# Patient Record
Sex: MALE | Race: WHITE | NOT HISPANIC OR LATINO | Employment: OTHER | URBAN - METROPOLITAN AREA
[De-identification: names, ages, dates, MRNs, and addresses within clinical notes are randomized per-mention and may not be internally consistent; named-entity substitution may affect disease eponyms.]

---

## 2017-01-04 ENCOUNTER — ALLSCRIPTS OFFICE VISIT (OUTPATIENT)
Dept: OTHER | Facility: OTHER | Age: 62
End: 2017-01-04

## 2017-01-04 DIAGNOSIS — R63.5 ABNORMAL WEIGHT GAIN: ICD-10-CM

## 2017-05-03 ENCOUNTER — TRANSCRIBE ORDERS (OUTPATIENT)
Dept: LAB | Facility: CLINIC | Age: 62
End: 2017-05-03

## 2017-05-03 ENCOUNTER — APPOINTMENT (OUTPATIENT)
Dept: LAB | Facility: CLINIC | Age: 62
End: 2017-05-03
Payer: COMMERCIAL

## 2017-05-03 DIAGNOSIS — R63.5 ABNORMAL WEIGHT GAIN: ICD-10-CM

## 2017-05-03 LAB — TSH SERPL DL<=0.05 MIU/L-ACNC: 1.13 UIU/ML (ref 0.36–3.74)

## 2017-05-03 PROCEDURE — 36415 COLL VENOUS BLD VENIPUNCTURE: CPT

## 2017-05-03 PROCEDURE — 84443 ASSAY THYROID STIM HORMONE: CPT

## 2017-07-18 ENCOUNTER — GENERIC CONVERSION - ENCOUNTER (OUTPATIENT)
Dept: OTHER | Facility: OTHER | Age: 62
End: 2017-07-18

## 2018-01-15 NOTE — PROGRESS NOTES
Assessment    1  Never a smoker   2  Hyperglycemia (790 29) (R73 9)   3  Encounter for preventive health examination (V70 0) (Z00 00)   4  Esophageal reflux (530 81) (K21 9)   5  Atrial fibrillation (427 31) (I48 91)   6  Vitamin D deficiency (268 9) (E55 9)   7  Migraine syndrome (346 00) (G43 909)   8  Erectile dysfunction of non-organic origin (302 72) (F52 21)   9  Weight gain (783 1) (R63 5)    Plan  Atrial fibrillation, Migraine syndrome, Transient ischemic attack    · Propranolol HCl ER 60 MG Oral Capsule Extended Release 24 Hour; take 1  capsule daily  Erectile dysfunction of non-organic origin    · Viagra 100 MG Oral Tablet; Take as directed  Health Maintenance    · Zostavax 25155 UNT/0 65ML Subcutaneous Solution Reconstituted (Zostavax  42323 UNT/0 65ML Subcutaneous Solution Reconstituted); ONCE RECONSTITUTIED  INJECT 0 65MLSUB Q ONE TIME  ONLY  Weight gain    · (1) TSH WITH FT4 REFLEX; Status:Active; Requested YST:15TOQ0608;     Discussion/Summary    #1  Health maintenance-colonoscopy up-to-date  Flu shot given today  Rx for shingles vaccine given today  #2  Migraines-stable continue current prophylactic medication  #3  GERD-stable  #4  Atrial fib- stable continue cardiology  #5  History of hyperglycemia-fasting sugar under 10 and A1C is 5 5  Recheck in one year  #6  ED-stable  #7  History of vitamin D deficiency-vit D level is 32 which is going down  Pt will go back to 2000 IU daily  #8  weight gain- check TSH level and recommend My Fitness Pal       Chief Complaint  Pt  here for annual wellness visit  No concerns noted  kck      History of Present Illness  HPI: This is a 62year old male who presents to the office today for annual visit  He is overall doing well on all curerent medications but does report some concern over a recent weight gain of 5 lbs  in the past year  He is interested in checking a TSH   He recently went to an [facility] for a URI for which he finished his course of azithromycin and states that this is resolved now  He requests refills for his migraine medication which has been controlled on propanolol as well as for his ED which is controlled on siladenafil  He denies any issues with atrial fibrillation or his Pradaxa and denies any issues related to GERD  He received his flu shot today  HIs blood pressure, blood sugar and lipids are all within normal range with the exception of a slight rise in his triglycerides which the pt states he will try to watch carbohydrate intake  Labs showed LDL 78 triglyceride 152 vitamin D 32 which is been declining in the past 3-4 years glucose less 100 A1c 5 5 PSA normal       Review of Systems    Constitutional: recent weight gain, but as noted in HPI  Eyes: No complaints of eye pain, no red eyes, no discharge from eyes, no itchy eyes  ENT: no complaints of earache, no hearing loss, no nosebleeds, no nasal discharge, no sore throat, no hoarseness  Cardiovascular: No complaints of slow heart rate, no fast heart rate, no chest pain, no palpitations, no leg claudication, no lower extremity  Respiratory: No complaints of shortness of breath, no wheezing, no cough, no SOB on exertion, no orthopnea or PND  Gastrointestinal: No complaints of abdominal pain, no constipation, no nausea or vomiting, no diarrhea or bloody stools  Genitourinary: No complaints of dysuria, no incontinence, no hesitancy, no nocturia, no genital lesion, no testicular pain  Musculoskeletal: No complaints of arthralgia, no myalgias, no joint swelling or stiffness, no limb pain or swelling  Integumentary: No complaints of skin rash or skin lesions, no itching, no skin wound, no dry skin  Neurological: No compliants of headache, no confusion, no convulsions, no numbness or tingling, no dizziness or fainting, no limb weakness, no difficulty walking     Psychiatric: Is not suicidal, no sleep disturbances, no anxiety or depression, no change in personality, no emotional problems  Endocrine: No complaints of proptosis, no hot flashes, no muscle weakness, no erectile dysfunction, no deepening of the voice, no feelings of weakness  Hematologic/Lymphatic: No complaints of swollen glands, no swollen glands in the neck, does not bleed easily, no easy bruising  ROS reviewed  Active Problems    1  Allergic rhinitis (477 9) (J30 9)   2  Atrial fibrillation (427 31) (I48 91)   3  Cough (786 2) (R05)   4  Diverticulitis of colon (562 11) (K57 32)   5  Elbow Tendonitis (727 09)   6  Erectile dysfunction of non-organic origin (302 72) (F52 21)   7  Esophageal reflux (530 81) (K21 9)   8  Fatigue (780 79) (R53 83)   9  Hyperglycemia (790 29) (R73 9)   10  Infection, upper respiratory (465 9) (J06 9)   11  Migraine syndrome (346 00) (G43 909)   12  Sinusitis (473 9) (J32 9)   13  Transient ischemic attack (435 9) (G45 9)   14  Vitamin D deficiency (268 9) (E55 9)    Past Medical History    · Acute renal insufficiency (593 9) (N28 9)   · History of transient cerebral ischemia (V12 54) (Z86 73)   · Vitamin D deficiency (268 9) (E55 9)    Family History  Mother    · Family history of Arthritis (V17 7)  Father    · Family history of Arthritis (V17 7)    Social History    · Being A Social Drinker   · Never a smoker    Current Meds   1  Aspirin 81 MG TABS; Therapy: (Recorded:78Ngs7524) to Recorded   2  Coconut Oil CAPS; TAKE 1 CAPSULE Daily Recorded   3  Glucosamine 500 MG Oral Capsule; TAKE 1 CAPSULE 3 TIMES DAILY WITH MEALS; Therapy: [id number] to Recorded   4  Multivitamins CAPS; Therapy: (Recorded:60Wir7891) to Recorded   5  Pradaxa 150 MG Oral Capsule; TAKE 1 CAPSULE DAILY Recorded   6  Propranolol HCl ER 60 MG Oral Capsule Extended Release 24 Hour; take 1 capsule   daily; Therapy: 98GJN4496 to (Last Rx:34Ckd2333)  Requested for: 49Rvx8473; Status:   ACTIVE - Renewal Denied Ordered   7  Pumpkin Seed Oil Oral Capsule; Therapy: 42DPS8371 to Recorded   8   Red Yeast Rice 600 MG Oral Capsule; Take as directed Recorded   9  Viagra 100 MG Oral Tablet; Take as directed; Therapy: 79MVA3946 to (Last Rx:03Jan2013)  Requested for: 72HHH8757 Ordered   10  Vitamin D 1000 UNIT CAPS; TAKE 1 CAPSULE Daily; Therapy: 95NGQ8001 to (Last Rx:02Nov2015) Ordered    Allergies    1  No Known Drug Allergies    Vitals   Recorded: 49CDG0683 10:12AM   Heart Rate 64   Systolic 339, LUE, Sitting   Diastolic 70, LUE, Sitting   Height 6 ft 0 8 in   Weight 295 lb    BMI Calculated 39 14   BSA Calculated 2 53     Physical Exam    Constitutional   General appearance: No acute distress, well appearing and well nourished  Head and Face   Head and face: Normal     Eyes   Conjunctiva and lids: No erythema, swelling or discharge  Ears, Nose, Mouth, and Throat   External inspection of ears and nose: Normal     Pulmonary   Respiratory effort: No increased work of breathing or signs of respiratory distress  Auscultation of lungs: Clear to auscultation  Cardiovascular   Auscultation of heart: Abnormal   Irregularly irregular without murmur  Abdomen   Stool sample for occult blood: Negative  Guaiac negative  Genitourinary   Digital rectal exam of prostate: Normal size, no masses  Musculoskeletal   Gait and station: Normal     Neurologic   Coordination: Normal finger to nose and heel to shin  Psychiatric   Judgment and insight: Normal     Mood and affect: Normal        Results/Data  PHQ-2 Adult Depression Screening 20MDZ0633 10:15AM User, s     Test Name Result Flag Reference   PHQ-2 Adult Depression Score 0     Over the last two weeks, how often have you been bothered by any of the following problems?   Little interest or pleasure in doing things: Not at all - 0  Feeling down, depressed, or hopeless: Not at all - 0   PHQ-2 Adult Depression Screening Negative         Signatures   Electronically signed by : Simon Benedict, [facility]; Jan 4 2017 11:42AM EST                       (Author) Electronically signed by : NAT Cleveland ; Jan 4 2017 12:57PM EST

## 2018-01-22 VITALS
DIASTOLIC BLOOD PRESSURE: 70 MMHG | SYSTOLIC BLOOD PRESSURE: 118 MMHG | HEART RATE: 64 BPM | HEIGHT: 73 IN | WEIGHT: 295 LBS | BODY MASS INDEX: 39.1 KG/M2

## 2018-02-21 ENCOUNTER — OFFICE VISIT (OUTPATIENT)
Dept: FAMILY MEDICINE CLINIC | Facility: CLINIC | Age: 63
End: 2018-02-21
Payer: COMMERCIAL

## 2018-02-21 VITALS
BODY MASS INDEX: 42.34 KG/M2 | WEIGHT: 302.4 LBS | DIASTOLIC BLOOD PRESSURE: 72 MMHG | SYSTOLIC BLOOD PRESSURE: 124 MMHG | HEIGHT: 71 IN | HEART RATE: 68 BPM

## 2018-02-21 DIAGNOSIS — Z23 NEED FOR SHINGLES VACCINE: ICD-10-CM

## 2018-02-21 DIAGNOSIS — K21.9 GASTROESOPHAGEAL REFLUX DISEASE WITHOUT ESOPHAGITIS: Primary | ICD-10-CM

## 2018-02-21 DIAGNOSIS — E78.2 MIXED HYPERLIPIDEMIA: ICD-10-CM

## 2018-02-21 DIAGNOSIS — Z12.5 PROSTATE CANCER SCREENING: ICD-10-CM

## 2018-02-21 DIAGNOSIS — R73.9 HYPERGLYCEMIA: ICD-10-CM

## 2018-02-21 DIAGNOSIS — Z23 NEED FOR INFLUENZA VACCINATION: ICD-10-CM

## 2018-02-21 DIAGNOSIS — G43.909 MIGRAINE SYNDROME: ICD-10-CM

## 2018-02-21 DIAGNOSIS — E55.9 VITAMIN D DEFICIENCY: ICD-10-CM

## 2018-02-21 PROCEDURE — 90686 IIV4 VACC NO PRSV 0.5 ML IM: CPT | Performed by: PHYSICIAN ASSISTANT

## 2018-02-21 PROCEDURE — 90471 IMMUNIZATION ADMIN: CPT | Performed by: PHYSICIAN ASSISTANT

## 2018-02-21 PROCEDURE — 99214 OFFICE O/P EST MOD 30 MIN: CPT | Performed by: PHYSICIAN ASSISTANT

## 2018-02-21 PROCEDURE — 3008F BODY MASS INDEX DOCD: CPT | Performed by: PHYSICIAN ASSISTANT

## 2018-02-21 RX ORDER — PROPRANOLOL HCL 60 MG
60 CAPSULE, EXTENDED RELEASE 24HR ORAL DAILY
Qty: 90 CAPSULE | Refills: 3 | Status: SHIPPED | OUTPATIENT
Start: 2018-02-21 | End: 2019-02-18 | Stop reason: SDUPTHER

## 2018-02-21 RX ORDER — MULTIVITAMIN
CAPSULE ORAL
COMMUNITY

## 2018-02-21 RX ORDER — LANOLIN ALCOHOL/MO/W.PET/CERES
1 CREAM (GRAM) TOPICAL
COMMUNITY

## 2018-02-21 RX ORDER — SILDENAFIL 100 MG/1
TABLET, FILM COATED ORAL
COMMUNITY
Start: 2011-12-14 | End: 2021-03-08 | Stop reason: SDUPTHER

## 2018-02-21 RX ORDER — DABIGATRAN ETEXILATE 150 MG/1
150 CAPSULE, COATED PELLETS ORAL
COMMUNITY
Start: 2017-09-01 | End: 2020-03-05

## 2018-02-21 RX ORDER — PERPHENAZINE/AMITRIPTYLINE HCL 4MG-10MG
2 TABLET ORAL DAILY
COMMUNITY

## 2018-02-21 RX ORDER — PROPRANOLOL HCL 60 MG
1 CAPSULE, EXTENDED RELEASE 24HR ORAL DAILY
COMMUNITY
Start: 2014-01-09 | End: 2018-02-21 | Stop reason: SDUPTHER

## 2018-02-21 RX ORDER — AMPICILLIN TRIHYDRATE 250 MG
CAPSULE ORAL
COMMUNITY

## 2018-02-21 NOTE — PROGRESS NOTES
Assessment/Plan:    No problem-specific Assessment & Plan notes found for this encounter  Diagnoses and all orders for this visit:    Gastroesophageal reflux disease without esophagitis    Migraine syndrome  -     propranolol (INDERAL LA) 60 mg 24 hr capsule; Take 1 capsule (60 mg total) by mouth daily    Vitamin D deficiency  -     Vitamin D 25 hydroxy; Future    Hyperglycemia  -     Comprehensive metabolic panel; Future  -     Hemoglobin A1c; Future    Need for shingles vaccine  -     Zoster Vaccine Live (ZOSTAVAX) 42143 UNT/0 65ML SUSR; Inject 1 application under the skin once for 1 dose    Prostate cancer screening  -     PSA; Future    Mixed hyperlipidemia  -     Lipid Panel with Direct LDL reflex; Future    Need for influenza vaccination  -     FLU VACCINE QUADRIVALENT GREATER THAN OR EQUAL TO 4YO PRESERVATIVE FREE IM    Other orders  -     aspirin 81 MG tablet; Take by mouth  -     Cholecalciferol 1000 units CHEW; Chew  -     dabigatran etexilate (PRADAXA) 150 mg capsu; Take 150 mg by mouth  -     glucosamine-chondroitin 500-400 MG tablet; Take 1 tablet by mouth  -     Multiple Vitamin (MULTIVITAMIN) capsule; Take by mouth  -     Discontinue: propranolol (INDERAL LA) 60 mg 24 hr capsule; Take 1 capsule by mouth daily  -     Misc Natural Products (PUMPKIN SEED OIL PO); Take by mouth  -     Red Yeast Rice 600 MG CAPS; Take by mouth  -     Coconut Oil 1000 MG CAPS; Take 1 capsule by mouth daily  -     sildenafil (VIAGRA) 100 mg tablet; Take by mouth  -     Discontinue: Zoster Vaccine Live (ZOSTAVAX) 18453 UNT/0 65ML SUSR; Inject under the skin          Subjective:   CC: Pt  Here routine yearly exam  Requesting script for blood work  Pt  Requesting script to have shingles vaccine at the pharmacy  [facility]     Patient ID: Dez Garcia is a 65 y o  male        Dez Garcia is here for chronic conditions f/u including the diagnosis of Gastroesophageal reflux disease without esophagitis  (primary encounter diagnosis)  Migraine syndrome  Vitamin d deficiency  Hyperglycemia   Pt  states they are taking all medications as directed without complaints or side effects  Patient states that he did receive a letter to get a colonoscopy that was last 1 was in 2009  He did not have blood work done prior to today's visit could knows that he needs them he is also due for a prostate exam today  He has no chronic problems he does need a refill of 1 of his medications he continues to see Cardiology for his AFib and he would like a another prescription for Zostavax as he did not go for this when he was last given it  The following portions of the patient's history were reviewed and updated as appropriate: allergies, current medications, past family history, past medical history, past social history, past surgical history and problem list     Review of Systems   Constitutional: Negative  HENT: Negative  Eyes: Negative  Respiratory: Negative  Cardiovascular: Negative  Gastrointestinal: Negative  Endocrine: Negative  Genitourinary: Negative  Musculoskeletal: Negative  Skin: Negative  Allergic/Immunologic: Negative  Neurological: Negative  Hematological: Negative  Psychiatric/Behavioral: Negative  Objective:      Vitals:    02/21/18 0737   BP: 124/72   BP Location: Left arm   Patient Position: Sitting   Pulse: 68   Weight: (!) 137 kg (302 lb 6 4 oz)   Height: 5' 11 25" (1 81 m)          Physical Exam   Constitutional: He is oriented to person, place, and time  He appears well-developed and well-nourished  No distress  HENT:   Head: Normocephalic and atraumatic  Eyes: Conjunctivae are normal  Right eye exhibits no discharge  Left eye exhibits no discharge  Neck: Carotid bruit is not present  Cardiovascular: Normal rate, regular rhythm and normal heart sounds  Exam reveals no gallop and no friction rub  No murmur heard    Pulmonary/Chest: Effort normal and breath sounds normal  No respiratory distress  He has no wheezes  He has no rales  Genitourinary: Rectum normal  Rectal exam shows no mass and no tenderness  Prostate is not enlarged and not tender  Neurological: He is alert and oriented to person, place, and time  Skin: Skin is warm and dry  He is not diaphoretic  Psychiatric: He has a normal mood and affect  Judgment normal    Nursing note and vitals reviewed

## 2018-02-21 NOTE — PATIENT INSTRUCTIONS
Problem List Items Addressed This Visit     Gastroesophageal reflux disease without esophagitis - Primary    Hyperglycemia    Relevant Orders    Comprehensive metabolic panel    Hemoglobin A1c    Migraine syndrome    Relevant Medications    aspirin 81 MG tablet    propranolol (INDERAL LA) 60 mg 24 hr capsule    Vitamin D deficiency    Relevant Orders    Vitamin D 25 hydroxy    Need for shingles vaccine    Relevant Medications    Zoster Vaccine Live (ZOSTAVAX) 46817 UNT/0 65ML SUSR    Mixed hyperlipidemia    Relevant Orders    Lipid Panel with Direct LDL reflex      Other Visit Diagnoses     Prostate cancer screening        Relevant Orders    PSA

## 2019-01-15 ENCOUNTER — OFFICE VISIT (OUTPATIENT)
Dept: MULTI SPECIALTY CLINIC | Facility: CLINIC | Age: 64
End: 2019-01-15

## 2019-01-15 VITALS
BODY MASS INDEX: 41.85 KG/M2 | DIASTOLIC BLOOD PRESSURE: 66 MMHG | HEIGHT: 71 IN | HEART RATE: 74 BPM | TEMPERATURE: 98.9 F | SYSTOLIC BLOOD PRESSURE: 92 MMHG | WEIGHT: 298.94 LBS

## 2019-01-15 DIAGNOSIS — Z23 ENCOUNTER FOR IMMUNIZATION: ICD-10-CM

## 2019-01-15 DIAGNOSIS — Z71.84 TRAVEL ADVICE ENCOUNTER: Primary | ICD-10-CM

## 2019-01-15 PROCEDURE — 99411 PREVENTIVE COUNSELING GROUP: CPT | Performed by: INTERNAL MEDICINE

## 2019-01-15 PROCEDURE — 90471 IMMUNIZATION ADMIN: CPT | Performed by: INTERNAL MEDICINE

## 2019-01-15 PROCEDURE — 90472 IMMUNIZATION ADMIN EACH ADD: CPT | Performed by: INTERNAL MEDICINE

## 2019-01-15 PROCEDURE — 90632 HEPA VACCINE ADULT IM: CPT | Performed by: INTERNAL MEDICINE

## 2019-01-15 PROCEDURE — 90691 TYPHOID VACCINE IM: CPT | Performed by: INTERNAL MEDICINE

## 2019-01-15 NOTE — PROGRESS NOTES
Travel Clinic    Patient is traveling to countries or areas within countries where immunizations are required or recommended:   [place], [place]    Patient states they will visit underdeveloped areas with poor sanitation Yes/No: Yes   Patient requires malaria prophylaxis Yes/No: No    No orders of the defined types were placed in this encounter        Patient instructed how to take medications Yes/No: Yes  Patient warned about side effects Yes/No: Yes  Patient declined Yes/No: No

## 2019-02-18 DIAGNOSIS — G43.909 MIGRAINE SYNDROME: ICD-10-CM

## 2019-02-18 RX ORDER — PROPRANOLOL HCL 60 MG
CAPSULE, EXTENDED RELEASE 24HR ORAL
Qty: 90 CAPSULE | Refills: 3 | Status: SHIPPED | OUTPATIENT
Start: 2019-02-18 | End: 2020-02-13

## 2019-02-25 ENCOUNTER — TRANSCRIBE ORDERS (OUTPATIENT)
Dept: LAB | Facility: CLINIC | Age: 64
End: 2019-02-25

## 2019-02-25 ENCOUNTER — APPOINTMENT (OUTPATIENT)
Dept: LAB | Facility: CLINIC | Age: 64
End: 2019-02-25
Payer: COMMERCIAL

## 2019-02-25 DIAGNOSIS — R73.9 BLOOD GLUCOSE ELEVATED: Primary | ICD-10-CM

## 2019-02-25 DIAGNOSIS — E55.9 AVITAMINOSIS D: ICD-10-CM

## 2019-02-25 DIAGNOSIS — Z12.5 SPECIAL SCREENING FOR MALIGNANT NEOPLASM OF PROSTATE: ICD-10-CM

## 2019-02-25 DIAGNOSIS — E78.2 MIXED HYPERLIPIDEMIA: ICD-10-CM

## 2019-02-25 LAB
25(OH)D3 SERPL-MCNC: 42.4 NG/ML (ref 30–100)
ALBUMIN SERPL BCP-MCNC: 3.3 G/DL (ref 3.5–5)
ALP SERPL-CCNC: 65 U/L (ref 46–116)
ALT SERPL W P-5'-P-CCNC: 26 U/L (ref 12–78)
ANION GAP SERPL CALCULATED.3IONS-SCNC: 4 MMOL/L (ref 4–13)
AST SERPL W P-5'-P-CCNC: 21 U/L (ref 5–45)
BILIRUB SERPL-MCNC: 1.05 MG/DL (ref 0.2–1)
BUN SERPL-MCNC: 27 MG/DL (ref 5–25)
CALCIUM SERPL-MCNC: 8.2 MG/DL (ref 8.3–10.1)
CHLORIDE SERPL-SCNC: 102 MMOL/L (ref 100–108)
CHOLEST SERPL-MCNC: 159 MG/DL (ref 50–200)
CO2 SERPL-SCNC: 30 MMOL/L (ref 21–32)
CREAT SERPL-MCNC: 1.21 MG/DL (ref 0.6–1.3)
EST. AVERAGE GLUCOSE BLD GHB EST-MCNC: 120 MG/DL
GFR SERPL CREATININE-BSD FRML MDRD: 64 ML/MIN/1.73SQ M
GLUCOSE P FAST SERPL-MCNC: 92 MG/DL (ref 65–99)
HBA1C MFR BLD: 5.8 % (ref 4.2–6.3)
HDLC SERPL-MCNC: 49 MG/DL (ref 40–60)
LDLC SERPL CALC-MCNC: 90 MG/DL (ref 0–100)
POTASSIUM SERPL-SCNC: 4.1 MMOL/L (ref 3.5–5.3)
PROT SERPL-MCNC: 7.2 G/DL (ref 6.4–8.2)
PSA SERPL-MCNC: 0.7 NG/ML (ref 0–4)
SODIUM SERPL-SCNC: 136 MMOL/L (ref 136–145)
TRIGL SERPL-MCNC: 100 MG/DL

## 2019-02-25 PROCEDURE — G0103 PSA SCREENING: HCPCS

## 2019-02-25 PROCEDURE — 82306 VITAMIN D 25 HYDROXY: CPT

## 2019-02-25 PROCEDURE — 36415 COLL VENOUS BLD VENIPUNCTURE: CPT

## 2019-02-25 PROCEDURE — 83036 HEMOGLOBIN GLYCOSYLATED A1C: CPT

## 2019-02-25 PROCEDURE — 80061 LIPID PANEL: CPT

## 2019-02-25 PROCEDURE — 80053 COMPREHEN METABOLIC PANEL: CPT

## 2019-02-26 PROBLEM — Z23 NEED FOR SHINGLES VACCINE: Status: RESOLVED | Noted: 2018-02-21 | Resolved: 2019-02-26

## 2019-02-27 ENCOUNTER — OFFICE VISIT (OUTPATIENT)
Dept: FAMILY MEDICINE CLINIC | Facility: CLINIC | Age: 64
End: 2019-02-27
Payer: COMMERCIAL

## 2019-02-27 VITALS
HEIGHT: 71 IN | DIASTOLIC BLOOD PRESSURE: 60 MMHG | BODY MASS INDEX: 42.39 KG/M2 | HEART RATE: 72 BPM | WEIGHT: 302.8 LBS | SYSTOLIC BLOOD PRESSURE: 118 MMHG

## 2019-02-27 DIAGNOSIS — E55.9 VITAMIN D DEFICIENCY: ICD-10-CM

## 2019-02-27 DIAGNOSIS — G43.909 MIGRAINE SYNDROME: ICD-10-CM

## 2019-02-27 DIAGNOSIS — Z23 NEED FOR INFLUENZA VACCINATION: ICD-10-CM

## 2019-02-27 DIAGNOSIS — Z12.5 PROSTATE CANCER SCREENING: ICD-10-CM

## 2019-02-27 DIAGNOSIS — G45.9 TRANSIENT ISCHEMIC ATTACK: ICD-10-CM

## 2019-02-27 DIAGNOSIS — F52.21 ERECTILE DYSFUNCTION OF NON-ORGANIC ORIGIN: ICD-10-CM

## 2019-02-27 DIAGNOSIS — I48.91 ATRIAL FIBRILLATION, UNSPECIFIED TYPE (HCC): ICD-10-CM

## 2019-02-27 DIAGNOSIS — E78.2 MIXED HYPERLIPIDEMIA: ICD-10-CM

## 2019-02-27 DIAGNOSIS — R73.9 HYPERGLYCEMIA: Primary | ICD-10-CM

## 2019-02-27 PROCEDURE — 3008F BODY MASS INDEX DOCD: CPT | Performed by: PHYSICIAN ASSISTANT

## 2019-02-27 PROCEDURE — 90682 RIV4 VACC RECOMBINANT DNA IM: CPT | Performed by: PHYSICIAN ASSISTANT

## 2019-02-27 PROCEDURE — 99214 OFFICE O/P EST MOD 30 MIN: CPT | Performed by: PHYSICIAN ASSISTANT

## 2019-02-27 PROCEDURE — 90471 IMMUNIZATION ADMIN: CPT | Performed by: PHYSICIAN ASSISTANT

## 2019-02-27 PROCEDURE — 1036F TOBACCO NON-USER: CPT | Performed by: PHYSICIAN ASSISTANT

## 2019-02-27 NOTE — ASSESSMENT & PLAN NOTE
Very stable fasting sugar of 92 with hemoglobin A1c of 5 8  Check 1 year  Continue decreasing carbohydrate

## 2019-02-27 NOTE — PATIENT INSTRUCTIONS
Problem List Items Addressed This Visit        Cardiovascular and Mediastinum    Atrial fibrillation [facility])     Continue with yearly cardiology follow-up  Migraine syndrome    Transient ischemic attack       Other    Erectile dysfunction of non-organic origin    Hyperglycemia - Primary     Very stable fasting sugar of 92 with hemoglobin A1c of 5 8  Check 1 year  Continue decreasing carbohydrate  Relevant Orders    Hemoglobin A1C    Comprehensive metabolic panel    Vitamin D deficiency     Vitamin-D level good at 42  Continue supplementation recheck 1 year  Relevant Orders    Vitamin D 25 hydroxy    Mixed hyperlipidemia     LDL stable at 90 continue over-the-counter red yeast rice recheck 1 year  Relevant Orders    Lipid Panel with Direct LDL reflex    Prostate cancer screening     PSA within normal limits recheck 1 year           Relevant Orders    PSA, Total Screen      Other Visit Diagnoses     Need for influenza vaccination        Relevant Orders    PREFERRED: influenza vaccine, 5759-8375, quadrivalent, recombinant, PF, 0 5 mL, for patients 18 yr+ (FLUBLOK) (Completed)

## 2020-02-13 DIAGNOSIS — G43.909 MIGRAINE SYNDROME: ICD-10-CM

## 2020-02-13 RX ORDER — PROPRANOLOL HCL 60 MG
CAPSULE, EXTENDED RELEASE 24HR ORAL
Qty: 90 CAPSULE | Refills: 0 | Status: SHIPPED | OUTPATIENT
Start: 2020-02-13 | End: 2020-05-13

## 2020-03-02 ENCOUNTER — APPOINTMENT (OUTPATIENT)
Dept: LAB | Facility: CLINIC | Age: 65
End: 2020-03-02
Payer: COMMERCIAL

## 2020-03-02 DIAGNOSIS — E55.9 VITAMIN D DEFICIENCY: ICD-10-CM

## 2020-03-02 DIAGNOSIS — E78.2 MIXED HYPERLIPIDEMIA: ICD-10-CM

## 2020-03-02 DIAGNOSIS — Z12.5 PROSTATE CANCER SCREENING: ICD-10-CM

## 2020-03-02 DIAGNOSIS — R73.9 HYPERGLYCEMIA: ICD-10-CM

## 2020-03-02 LAB
25(OH)D3 SERPL-MCNC: 44.5 NG/ML (ref 30–100)
ALBUMIN SERPL BCP-MCNC: 3.4 G/DL (ref 3.5–5)
ALP SERPL-CCNC: 73 U/L (ref 46–116)
ALT SERPL W P-5'-P-CCNC: 26 U/L (ref 12–78)
ANION GAP SERPL CALCULATED.3IONS-SCNC: 6 MMOL/L (ref 4–13)
AST SERPL W P-5'-P-CCNC: 16 U/L (ref 5–45)
BILIRUB SERPL-MCNC: 1.02 MG/DL (ref 0.2–1)
BUN SERPL-MCNC: 25 MG/DL (ref 5–25)
CALCIUM SERPL-MCNC: 8.8 MG/DL (ref 8.3–10.1)
CHLORIDE SERPL-SCNC: 106 MMOL/L (ref 100–108)
CHOLEST SERPL-MCNC: 153 MG/DL (ref 50–200)
CO2 SERPL-SCNC: 28 MMOL/L (ref 21–32)
CREAT SERPL-MCNC: 1.07 MG/DL (ref 0.6–1.3)
EST. AVERAGE GLUCOSE BLD GHB EST-MCNC: 103 MG/DL
GFR SERPL CREATININE-BSD FRML MDRD: 73 ML/MIN/1.73SQ M
GLUCOSE P FAST SERPL-MCNC: 94 MG/DL (ref 65–99)
HBA1C MFR BLD: 5.2 %
HDLC SERPL-MCNC: 47 MG/DL
LDLC SERPL CALC-MCNC: 77 MG/DL (ref 0–100)
POTASSIUM SERPL-SCNC: 4.1 MMOL/L (ref 3.5–5.3)
PROT SERPL-MCNC: 7.4 G/DL (ref 6.4–8.2)
PSA SERPL-MCNC: 1.1 NG/ML (ref 0–4)
SODIUM SERPL-SCNC: 140 MMOL/L (ref 136–145)
TRIGL SERPL-MCNC: 147 MG/DL

## 2020-03-02 PROCEDURE — G0103 PSA SCREENING: HCPCS

## 2020-03-02 PROCEDURE — 82306 VITAMIN D 25 HYDROXY: CPT

## 2020-03-02 PROCEDURE — 36415 COLL VENOUS BLD VENIPUNCTURE: CPT

## 2020-03-02 PROCEDURE — 80061 LIPID PANEL: CPT

## 2020-03-02 PROCEDURE — 83036 HEMOGLOBIN GLYCOSYLATED A1C: CPT

## 2020-03-02 PROCEDURE — 80053 COMPREHEN METABOLIC PANEL: CPT

## 2020-03-04 RX ORDER — IPRATROPIUM BROMIDE 42 UG/1
SPRAY, METERED NASAL
COMMUNITY
Start: 2019-12-11

## 2020-03-04 NOTE — PROGRESS NOTES
Assessment and Plan:  I will see pt in one year  Problem List Items Addressed This Visit        Cardiovascular and Mediastinum    Atrial fibrillation [facility])     Continue with cardiology patient was switched to Pradaxa  Relevant Medications    tadalafil (CIALIS) 20 MG tablet    Migraine syndrome     Stable Inderal daily  Refills given  Cerebral embolism with cerebral infarction ([facility] [address] )     No residual affects  Other    Erectile dysfunction of non-organic origin     Pt states the viagra is not working a sit used to although what he has is 33 years old  We will trial cialis 20 mg as needed and he will let me know if he would like this to his mail order if it works  Relevant Medications    tadalafil (CIALIS) 20 MG tablet    Hyperglycemia    Relevant Orders    Comprehensive metabolic panel    Hemoglobin A1C    Vitamin D deficiency     Vitamin-D level very good on current supplementation at 44 continue check yearly  Relevant Orders    Vitamin D 25 hydroxy    Mixed hyperlipidemia - Primary     Doing very well on red yeast rice with a LDL of 77  Continue check yearly  Relevant Orders    Lipid Panel with Direct LDL reflex    Prostate cancer screening    Relevant Orders    PSA, Total Screen      Other Visit Diagnoses     Need for hepatitis C screening test        Relevant Orders    Hepatitis C antibody    Screening for HIV (human immunodeficiency virus)        Relevant Orders    St  [facility] HIV 1/2 AG-AB combo                 Diagnoses and all orders for this visit:    Mixed hyperlipidemia  -     Lipid Panel with Direct LDL reflex; Future    Vitamin D deficiency  -     Vitamin D 25 hydroxy; Future    Erectile dysfunction of non-organic origin  -     tadalafil (CIALIS) 20 MG tablet;  Take 1 tablet (20 mg total) by mouth daily as needed for erectile dysfunction    Atrial fibrillation, unspecified type ([facility] [address] )    [facility]    Migraine syndrome    Prostate cancer screening  -     PSA, Total Screen; Future    Need for hepatitis C screening test  -     Hepatitis C antibody; Future    Hyperglycemia  -     Comprehensive metabolic panel; Future  -     Hemoglobin A1C; Future    Screening for HIV (human immunodeficiency virus)  -     [facility] HIV 1/2 AG-AB combo; Future    Other orders  -     rivaroxaban (XARELTO) 20 mg tablet; Take 20 mg by mouth  -     ipratropium (ATROVENT) 0 06 % nasal spray  -     Ascorbic Acid (VITAMIN C PO); Take by mouth              Subjective:      Patient ID: Alva Benavidez is a 62 y o  male  CC:    Chief Complaint   Patient presents with    Annual Exam       HPI:      Alva Benavidez is here for chronic conditions f/u including the diagnosis of No diagnosis found    Pt  states they are taking all medications as directed without complaints or side effects   Pt  had labs done prior to today's visit which included Recent Results (from the past 672 hour(s))  -Lipid Panel with Direct LDL reflex  Collection Time: 03/02/20  7:05 AM       Result                      Value             Ref Range           Cholesterol                 153               50 - 200 mg/*       Triglycerides               147               <=150 mg/dL         HDL, Direct                 47                >=40 mg/dL          LDL Calculated              77                0 - 100 mg/dL  -Vitamin D 25 hydroxy  Collection Time: 03/02/20  7:05 AM       Result                      Value             Ref Range           Vit D, 25-Hydroxy           44 5              30 0 - 100 0*  -Hemoglobin A1C  Collection Time: 03/02/20  7:05 AM       Result                      Value             Ref Range           Hemoglobin A1C              5 2               Normal 3 8-5*       EAG                         103               mg/dl          -Comprehensive metabolic panel  Collection Time: 03/02/20  7:05 AM       Result                      Value Ref Range           Sodium                      140               136 - 145 mm*       Potassium                   4 1               3 5 - 5 3 mm*       Chloride                    106               100 - 108 mm*       CO2                         28                21 - 32 mmol*       ANION GAP                   6                 4 - 13 mmol/L       BUN                         25                5 - 25 mg/dL        Creatinine                  1 07              0 60 - 1 30 *       Glucose, Fasting            94                65 - 99 mg/dL       Calcium                     8 8               8 3 - 10 1 m*       AST                         16                5 - 45 U/L          ALT                         26                12 - 78 U/L         Alkaline Phosphatase        73                46 - 116 U/L        Total Protein               7 4               6 4 - 8 2 g/*       Albumin                     3 4 (L)           3 5 - 5 0 g/*       Total Bilirubin             1 02 (H)          0 20 - 1 00 *       eGFR                        73                ml/min/1 73s*  -PSA, Total Screen  Collection Time: 03/02/20  7:05 AM       Result                      Value             Ref Range           PSA                         1 1               0 0 - 4 0 ng*        The following portions of the patient's history were reviewed and updated as appropriate: allergies, current medications, past family history, past medical history, past social history, past surgical history and problem list       Review of Systems   Constitutional: Negative  HENT: Negative  Eyes: Negative  Respiratory: Negative  Cardiovascular: Negative  Gastrointestinal: Negative  Endocrine: Negative  Genitourinary: Negative  Musculoskeletal: Negative  Skin: Negative  Allergic/Immunologic: Negative  Neurological: Negative  Hematological: Negative  Psychiatric/Behavioral: Negative            Data to review:       Objective:    Vitals: 03/05/20 0754   BP: 110/66   BP Location: Left arm   Patient Position: Sitting   Cuff Size: Adult   Pulse: 84   Resp: 18   Temp: 97 5 °F (36 4 °C)   TempSrc: Tympanic   Weight: (!) 139 kg (307 lb)   Height: 5' 11 5" (1 816 m)        Physical Exam   Constitutional: He is oriented to person, place, and time  He appears well-developed and well-nourished  No distress  HENT:   Head: Normocephalic and atraumatic  Right Ear: Hearing, tympanic membrane, external ear and ear canal normal    Left Ear: Hearing, tympanic membrane, external ear and ear canal normal    Nose: Nose normal    Mouth/Throat: Uvula is midline, oropharynx is clear and moist and mucous membranes are normal  No oropharyngeal exudate  Eyes: Pupils are equal, round, and reactive to light  Conjunctivae, EOM and lids are normal  No scleral icterus  Neck: Normal range of motion  Carotid bruit is not present  No thyroid mass and no thyromegaly present  Cardiovascular: Regular rhythm, normal heart sounds and normal pulses  Exam reveals no gallop and no friction rub  No murmur heard  Pulmonary/Chest: Effort normal and breath sounds normal  No respiratory distress  He has no wheezes  He has no rhonchi  He has no rales  Abdominal: Soft  Normal appearance and bowel sounds are normal  He exhibits no distension, no abdominal bruit and no mass  There is no hepatosplenomegaly  There is no tenderness  There is no rebound and no guarding  No hernia  Musculoskeletal: Normal range of motion  Lymphadenopathy:     He has no cervical adenopathy  Neurological: He is alert and oriented to person, place, and time  He has normal strength and normal reflexes  He is not disoriented  No sensory deficit  He exhibits normal muscle tone  Coordination and gait normal    Skin: Skin is warm, dry and intact  He is not diaphoretic  Psychiatric: He has a normal mood and affect   His speech is normal and behavior is normal  Judgment and thought content normal  Cognition and memory are normal    Nursing note and vitals reviewed  BMI Counseling: Body mass index is 42 22 kg/m²  The BMI is above normal  Nutrition recommendations include reducing portion sizes

## 2020-03-05 ENCOUNTER — OFFICE VISIT (OUTPATIENT)
Dept: FAMILY MEDICINE CLINIC | Facility: CLINIC | Age: 65
End: 2020-03-05
Payer: COMMERCIAL

## 2020-03-05 VITALS
HEIGHT: 72 IN | DIASTOLIC BLOOD PRESSURE: 66 MMHG | WEIGHT: 307 LBS | BODY MASS INDEX: 41.58 KG/M2 | SYSTOLIC BLOOD PRESSURE: 110 MMHG | HEART RATE: 84 BPM | TEMPERATURE: 97.5 F | RESPIRATION RATE: 18 BRPM

## 2020-03-05 DIAGNOSIS — Z11.59 NEED FOR HEPATITIS C SCREENING TEST: ICD-10-CM

## 2020-03-05 DIAGNOSIS — F52.21 ERECTILE DYSFUNCTION OF NON-ORGANIC ORIGIN: ICD-10-CM

## 2020-03-05 DIAGNOSIS — E78.2 MIXED HYPERLIPIDEMIA: Primary | ICD-10-CM

## 2020-03-05 DIAGNOSIS — Z12.5 PROSTATE CANCER SCREENING: ICD-10-CM

## 2020-03-05 DIAGNOSIS — R73.9 HYPERGLYCEMIA: ICD-10-CM

## 2020-03-05 DIAGNOSIS — I48.91 ATRIAL FIBRILLATION, UNSPECIFIED TYPE (HCC): ICD-10-CM

## 2020-03-05 DIAGNOSIS — Z11.4 SCREENING FOR HIV (HUMAN IMMUNODEFICIENCY VIRUS): ICD-10-CM

## 2020-03-05 DIAGNOSIS — G43.909 MIGRAINE SYNDROME: ICD-10-CM

## 2020-03-05 DIAGNOSIS — E55.9 VITAMIN D DEFICIENCY: ICD-10-CM

## 2020-03-05 DIAGNOSIS — I63.40 CEREBRAL INFARCTION DUE TO EMBOLISM OF CEREBRAL ARTERY (HCC): ICD-10-CM

## 2020-03-05 PROCEDURE — 3008F BODY MASS INDEX DOCD: CPT | Performed by: PHYSICIAN ASSISTANT

## 2020-03-05 PROCEDURE — 99214 OFFICE O/P EST MOD 30 MIN: CPT | Performed by: PHYSICIAN ASSISTANT

## 2020-03-05 PROCEDURE — 1036F TOBACCO NON-USER: CPT | Performed by: PHYSICIAN ASSISTANT

## 2020-03-05 RX ORDER — TADALAFIL 20 MG/1
20 TABLET ORAL DAILY PRN
Qty: 10 TABLET | Refills: 0 | Status: SHIPPED | OUTPATIENT
Start: 2020-03-05 | End: 2021-03-08

## 2020-03-05 NOTE — PATIENT INSTRUCTIONS
Problem List Items Addressed This Visit        Cardiovascular and Mediastinum    Atrial fibrillation [facility])     Continue with cardiology patient was switched to Pradaxa  Relevant Medications    tadalafil (CIALIS) 20 MG tablet    Migraine syndrome     Stable Inderal daily  Refills given  Cerebral embolism with cerebral infarction ([address] )     No residual affects  Other    Erectile dysfunction of non-organic origin     Pt states the viagra is not working a sit used to although what he has is 33 years old  We will trial cialis 20 mg as needed and he will let me know if he would like this to his mail order if it works  Relevant Medications    tadalafil (CIALIS) 20 MG tablet    Hyperglycemia    Relevant Orders    Comprehensive metabolic panel    Hemoglobin A1C    Vitamin D deficiency     Vitamin-D level very good on current supplementation at 45 continue check yearly  Relevant Orders    Vitamin D 25 hydroxy    Mixed hyperlipidemia - Primary     Doing very well on red yeast rice with a LDL of 77  Continue check yearly           Relevant Orders    Lipid Panel with Direct LDL reflex    Prostate cancer screening    Relevant Orders    PSA, Total Screen      Other Visit Diagnoses     Need for hepatitis C screening test        Relevant Orders    Hepatitis C antibody    Screening for HIV (human immunodeficiency virus)        Relevant Orders    [facility] HIV 1/2 AG-AB combo

## 2020-03-05 NOTE — ASSESSMENT & PLAN NOTE
Pt states the viagra is not working a sit used to although what he has is 34 years old  We will trial cialis 20 mg as needed and he will let me know if he would like this to his mail order if it works

## 2020-05-13 DIAGNOSIS — G43.909 MIGRAINE SYNDROME: ICD-10-CM

## 2020-05-13 RX ORDER — PROPRANOLOL HCL 60 MG
CAPSULE, EXTENDED RELEASE 24HR ORAL
Qty: 90 CAPSULE | Refills: 3 | Status: SHIPPED | OUTPATIENT
Start: 2020-05-13 | End: 2021-05-20 | Stop reason: SDUPTHER

## 2021-03-04 ENCOUNTER — LAB (OUTPATIENT)
Dept: LAB | Facility: CLINIC | Age: 66
End: 2021-03-04
Payer: COMMERCIAL

## 2021-03-04 DIAGNOSIS — E78.2 MIXED HYPERLIPIDEMIA: ICD-10-CM

## 2021-03-04 DIAGNOSIS — E55.9 VITAMIN D DEFICIENCY: ICD-10-CM

## 2021-03-04 DIAGNOSIS — R73.9 HYPERGLYCEMIA: ICD-10-CM

## 2021-03-04 DIAGNOSIS — Z11.4 SCREENING FOR HIV (HUMAN IMMUNODEFICIENCY VIRUS): ICD-10-CM

## 2021-03-04 DIAGNOSIS — Z11.59 NEED FOR HEPATITIS C SCREENING TEST: ICD-10-CM

## 2021-03-04 DIAGNOSIS — Z12.5 PROSTATE CANCER SCREENING: ICD-10-CM

## 2021-03-04 LAB
25(OH)D3 SERPL-MCNC: 45.4 NG/ML (ref 30–100)
ALBUMIN SERPL BCP-MCNC: 3.4 G/DL (ref 3.5–5)
ALP SERPL-CCNC: 81 U/L (ref 46–116)
ALT SERPL W P-5'-P-CCNC: 22 U/L (ref 12–78)
ANION GAP SERPL CALCULATED.3IONS-SCNC: 3 MMOL/L (ref 4–13)
AST SERPL W P-5'-P-CCNC: 17 U/L (ref 5–45)
BILIRUB SERPL-MCNC: 1.02 MG/DL (ref 0.2–1)
BUN SERPL-MCNC: 23 MG/DL (ref 5–25)
CALCIUM ALBUM COR SERPL-MCNC: 9.3 MG/DL (ref 8.3–10.1)
CALCIUM SERPL-MCNC: 8.8 MG/DL (ref 8.3–10.1)
CHLORIDE SERPL-SCNC: 104 MMOL/L (ref 100–108)
CHOLEST SERPL-MCNC: 159 MG/DL (ref 50–200)
CO2 SERPL-SCNC: 32 MMOL/L (ref 21–32)
CREAT SERPL-MCNC: 1.19 MG/DL (ref 0.6–1.3)
EST. AVERAGE GLUCOSE BLD GHB EST-MCNC: 111 MG/DL
GFR SERPL CREATININE-BSD FRML MDRD: 64 ML/MIN/1.73SQ M
GLUCOSE P FAST SERPL-MCNC: 90 MG/DL (ref 65–99)
HBA1C MFR BLD: 5.5 %
HCV AB SER QL: NORMAL
HDLC SERPL-MCNC: 46 MG/DL
LDLC SERPL CALC-MCNC: 91 MG/DL (ref 0–100)
POTASSIUM SERPL-SCNC: 4.3 MMOL/L (ref 3.5–5.3)
PROT SERPL-MCNC: 7.6 G/DL (ref 6.4–8.2)
PSA SERPL-MCNC: 1.5 NG/ML (ref 0–4)
SODIUM SERPL-SCNC: 139 MMOL/L (ref 136–145)
TRIGL SERPL-MCNC: 112 MG/DL

## 2021-03-04 PROCEDURE — 87389 HIV-1 AG W/HIV-1&-2 AB AG IA: CPT

## 2021-03-04 PROCEDURE — 80061 LIPID PANEL: CPT

## 2021-03-04 PROCEDURE — G0103 PSA SCREENING: HCPCS

## 2021-03-04 PROCEDURE — 83036 HEMOGLOBIN GLYCOSYLATED A1C: CPT

## 2021-03-04 PROCEDURE — 80053 COMPREHEN METABOLIC PANEL: CPT

## 2021-03-04 PROCEDURE — 36415 COLL VENOUS BLD VENIPUNCTURE: CPT

## 2021-03-04 PROCEDURE — 82306 VITAMIN D 25 HYDROXY: CPT

## 2021-03-04 PROCEDURE — 86803 HEPATITIS C AB TEST: CPT

## 2021-03-05 LAB — HIV 1+2 AB+HIV1 P24 AG SERPL QL IA: NORMAL

## 2021-03-06 PROBLEM — Z12.5 PROSTATE CANCER SCREENING: Status: RESOLVED | Noted: 2019-02-27 | Resolved: 2021-03-06

## 2021-03-08 ENCOUNTER — OFFICE VISIT (OUTPATIENT)
Dept: FAMILY MEDICINE CLINIC | Facility: CLINIC | Age: 66
End: 2021-03-08
Payer: COMMERCIAL

## 2021-03-08 VITALS
WEIGHT: 311 LBS | TEMPERATURE: 96.6 F | BODY MASS INDEX: 42.12 KG/M2 | HEART RATE: 76 BPM | SYSTOLIC BLOOD PRESSURE: 112 MMHG | HEIGHT: 72 IN | DIASTOLIC BLOOD PRESSURE: 70 MMHG

## 2021-03-08 DIAGNOSIS — Z12.5 PROSTATE CANCER SCREENING: ICD-10-CM

## 2021-03-08 DIAGNOSIS — I48.91 ATRIAL FIBRILLATION, UNSPECIFIED TYPE (HCC): Primary | ICD-10-CM

## 2021-03-08 DIAGNOSIS — I63.40 CEREBRAL INFARCTION DUE TO EMBOLISM OF CEREBRAL ARTERY (HCC): ICD-10-CM

## 2021-03-08 DIAGNOSIS — F52.21 ERECTILE DYSFUNCTION OF NON-ORGANIC ORIGIN: ICD-10-CM

## 2021-03-08 DIAGNOSIS — R73.9 HYPERGLYCEMIA: ICD-10-CM

## 2021-03-08 DIAGNOSIS — Z12.11 SCREEN FOR COLON CANCER: ICD-10-CM

## 2021-03-08 DIAGNOSIS — E78.2 MIXED HYPERLIPIDEMIA: ICD-10-CM

## 2021-03-08 DIAGNOSIS — E66.01 MORBID OBESITY (HCC): ICD-10-CM

## 2021-03-08 DIAGNOSIS — G43.909 MIGRAINE SYNDROME: ICD-10-CM

## 2021-03-08 DIAGNOSIS — E55.9 VITAMIN D DEFICIENCY: ICD-10-CM

## 2021-03-08 PROCEDURE — 3725F SCREEN DEPRESSION PERFORMED: CPT | Performed by: PHYSICIAN ASSISTANT

## 2021-03-08 PROCEDURE — 1036F TOBACCO NON-USER: CPT | Performed by: PHYSICIAN ASSISTANT

## 2021-03-08 PROCEDURE — 99214 OFFICE O/P EST MOD 30 MIN: CPT | Performed by: PHYSICIAN ASSISTANT

## 2021-03-08 PROCEDURE — 3008F BODY MASS INDEX DOCD: CPT | Performed by: PHYSICIAN ASSISTANT

## 2021-03-08 RX ORDER — CALCIUM CARBONATE/VITAMIN D3 500-10/5ML
LIQUID (ML) ORAL
COMMUNITY

## 2021-03-08 RX ORDER — SILDENAFIL 100 MG/1
100 TABLET, FILM COATED ORAL AS NEEDED
Qty: 10 TABLET | Refills: 0 | Status: SHIPPED | OUTPATIENT
Start: 2021-03-08 | End: 2021-07-06 | Stop reason: SDUPTHER

## 2021-03-08 RX ORDER — METHYLDOPA/HYDROCHLOROTHIAZIDE 250MG-15MG
TABLET ORAL
COMMUNITY

## 2021-03-08 NOTE — ASSESSMENT & PLAN NOTE
Patient is motivated to lose at least 100 lb  Given information on [employer] and [employer]  Patient needs to restrict calories and increase exercise    Patient is going on a cruise in 2022 and this is his goal

## 2021-03-08 NOTE — PROGRESS NOTES
Assessment and Plan:    Problem List Items Addressed This Visit        Cardiovascular and Mediastinum    Atrial fibrillation ([facility] ) - Primary     Stable patient continues on Xarelto  Continue with cardiology  Relevant Medications    sildenafil (Viagra) 100 mg tablet    Migraine syndrome     Patient continues on Inderal LA 60 mg once daily  Cerebral embolism with cerebral infarction [facility])     Found incidentally no residual affects  Aspirin 81 mg once daily  Other    Erectile dysfunction of non-organic origin      Corrected with as needed Viagra  Refills given  Relevant Medications    sildenafil (Viagra) 100 mg tablet    Hyperglycemia     Very stable with a hemoglobin A1c of 5 5 and a fasting sugar of 90  Relevant Orders    Hemoglobin A1C    Comprehensive metabolic panel    Vitamin D deficiency     Vitamin-D very stable at 39 continues current supplementation check 1 year  Relevant Orders    Vitamin D 25 hydroxy    Mixed hyperlipidemia     Patient only on red yeast rice within LDL under 100 at 91  Relevant Orders    Lipid Panel with Direct LDL reflex    Morbid obesity ([facility] )       Patient is motivated to lose at least 100 lb  Given information on [employer] and [employer]  Patient needs to restrict calories and increase exercise  Patient is going on a cruise in 2022 and this is his goal            Other Visit Diagnoses     Prostate cancer screening        Relevant Orders    PSA, Total Screen    Screen for colon cancer        Relevant Orders    Ambulatory referral to Colorectal Surgery                 Diagnoses and all orders for this visit:    Atrial fibrillation, unspecified type ([facility] )    Cerebral infarction due to embolism of cerebral artery (HCC)    Vitamin D deficiency  -     Vitamin D 25 hydroxy; Future    Hyperglycemia  -     Hemoglobin A1C; Future  -     Comprehensive metabolic panel;  Future    Erectile dysfunction of non-organic origin  - sildenafil (Viagra) 100 mg tablet; Take 1 tablet (100 mg total) by mouth as needed for erectile dysfunction    Mixed hyperlipidemia  -     Lipid Panel with Direct LDL reflex; Future    Migraine syndrome    Prostate cancer screening  -     PSA, Total Screen; Future    Screen for colon cancer  -     Ambulatory referral to Colorectal Surgery; Future    Morbid obesity ([address] )    Other orders  -     vitamin E 100 UNIT capsule; Take 100 Units by mouth daily  -     Zinc 30 MG CAPS; Take by mouth  -     Menatetrenone (Vitamin K2) 100 MCG TABS; Take by mouth              Subjective:      Patient ID: Mani Bell is a 60 y o  male  CC:    Chief Complaint   Patient presents with    Follow-up     Chronic conditions   Results     Labs  mjs       HPI:      Mani Bell is here for chronic conditions f/u including the diagnosis of Atrial fibrillation, unspecified type (hcc)  (primary encounter diagnosis)  Cerebral infarction due to embolism of cerebral artery (hcc)  Vitamin d deficiency  Hyperglycemia  Erectile dysfunction of non-organic origin  Mixed hyperlipidemia  Migraine syndrome  Prostate cancer screening  Screen for colon cancer   Pt  states they are taking all medications as directed without complaints or side effects   Pt  had labs done prior to today's visit which included Recent Results (from the past 672 hour(s))  -[facility] HIV 1/2 AG-AB combo  Collection Time: 03/04/21  7:08 AM       Result                      Value             Ref Range           HIV-1/HIV-2 Ab              Non-Reactive      Non-Reactive   -Comprehensive metabolic panel  Collection Time: 03/04/21  7:08 AM       Result                      Value             Ref Range           Sodium                      139               136 - 145 mm*       Potassium                   4 3               3 5 - 5 3 mm*       Chloride                    104               100 - 108 mm*       CO2                         32                21 - 32 mmol* ANION GAP                   3 (L)             4 - 13 mmol/L       BUN                         23                5 - 25 mg/dL        Creatinine                  1 19              0 60 - 1 30 *       Glucose, Fasting            90                65 - 99 mg/dL       Calcium                     8 8               8 3 - 10 1 m*       Corrected Calcium           9 3               8 3 - 10 1 m*       AST                         17                5 - 45 U/L          ALT                         22                12 - 78 U/L         Alkaline Phosphatase        81                46 - 116 U/L        Total Protein               7 6               6 4 - 8 2 g/*       Albumin                     3 4 (L)           3 5 - 5 0 g/*       Total Bilirubin             1 02 (H)          0 20 - 1 00 *       eGFR                        64                ml/min/1 73s*  -Hemoglobin A1C  Collection Time: 03/04/21  7:08 AM       Result                      Value             Ref Range           Hemoglobin A1C              5 5               Normal 3 8-5*       EAG                         111               mg/dl          -Lipid Panel with Direct LDL reflex  Collection Time: 03/04/21  7:08 AM       Result                      Value             Ref Range           Cholesterol                 159               50 - 200 mg/*       Triglycerides               112               <=150 mg/dL         HDL, Direct                 46                >=40 mg/dL          LDL Calculated              91                0 - 100 mg/dL  -Vitamin D 25 hydroxy  Collection Time: 03/04/21  7:08 AM       Result                      Value             Ref Range           Vit D, 25-Hydroxy           45 4              30 0 - 100 0*  -Hepatitis C antibody  Collection Time: 03/04/21  7:08 AM       Result                      Value             Ref Range           Hepatitis C Ab              Non-reactive      Non-reactive   -PSA, Total Screen  Collection Time: 03/04/21  7:08 AM Result                      Value             Ref Range           PSA                         1 5               0 0 - 4 0 ng*        The following portions of the patient's history were reviewed and updated as appropriate: allergies, current medications, past family history, past medical history, past social history, past surgical history and problem list       Review of Systems   Constitutional: Negative  HENT: Negative  Eyes: Negative  Respiratory: Negative  Cardiovascular: Negative  Gastrointestinal: Negative  Endocrine: Negative  Genitourinary: Negative  Musculoskeletal: Negative  Skin: Negative  Allergic/Immunologic: Negative  Neurological: Negative  Hematological: Negative  Psychiatric/Behavioral: Negative  Data to review:       Objective:    Vitals:    03/08/21 0803   BP: 112/70   Pulse: 76   Temp: (!) 96 6 °F (35 9 °C)   Weight: (!) 141 kg (311 lb)   Height: 6' (1 829 m)        Physical Exam  Vitals signs and nursing note reviewed  Constitutional:       Appearance: Normal appearance  HENT:      Head: Normocephalic and atraumatic  Eyes:      General: Lids are normal       Conjunctiva/sclera: Conjunctivae normal       Pupils: Pupils are equal, round, and reactive to light  Cardiovascular:      Rate and Rhythm: Normal rate  Rhythm irregularly irregular  Heart sounds: Normal heart sounds  Pulmonary:      Effort: Pulmonary effort is normal       Breath sounds: Normal breath sounds  Skin:     General: Skin is warm and dry  Neurological:      General: No focal deficit present  Mental Status: He is alert  Mental status is at baseline  Psychiatric:         Mood and Affect: Mood normal          Behavior: Behavior normal          Thought Content: Thought content normal          Judgment: Judgment normal            BMI Counseling: Body mass index is 42 18 kg/m²   The BMI is above normal  Nutrition recommendations include decreasing portion sizes, encouraging healthy choices of fruits and vegetables, decreasing fast food intake, consuming healthier snacks, limiting drinks that contain sugar, moderation in carbohydrate intake, increasing intake of lean protein, reducing intake of saturated and trans fat and reducing intake of cholesterol  Exercise recommendations include exercising 3-5 times per week  No pharmacotherapy was ordered  BMI Counseling: Body mass index is 42 18 kg/m²  The BMI is above normal  Nutrition recommendations include reducing portion sizes, decreasing overall calorie intake, 3-5 servings of fruits/vegetables daily, reducing fast food intake, consuming healthier snacks, decreasing soda and/or juice intake, moderation in carbohydrate intake, increasing intake of lean protein, reducing intake of saturated fat and trans fat and reducing intake of cholesterol

## 2021-03-08 NOTE — PATIENT INSTRUCTIONS
Problem List Items Addressed This Visit        Cardiovascular and Mediastinum    Atrial fibrillation ([facility] ) - Primary     Stable patient continues on Xarelto  Continue with cardiology  Relevant Medications    sildenafil (Viagra) 100 mg tablet    Migraine syndrome     Patient continues on Inderal LA 60 mg once daily  Cerebral embolism with cerebral infarction [facility])     Found incidentally no residual affects  Aspirin 81 mg once daily  Other    Erectile dysfunction of non-organic origin      Corrected with as needed Viagra  Refills given  Relevant Medications    sildenafil (Viagra) 100 mg tablet    Hyperglycemia     Very stable with a hemoglobin A1c of 5 5 and a fasting sugar of 90  Relevant Orders    Hemoglobin A1C    Comprehensive metabolic panel    Vitamin D deficiency     Vitamin-D very stable at 39 continues current supplementation check 1 year  Relevant Orders    Vitamin D 25 hydroxy    Mixed hyperlipidemia     Patient only on red yeast rice within LDL under 100 at 91  Relevant Orders    Lipid Panel with Direct LDL reflex    Morbid obesity ([facility] )       Patient is motivated to lose at least 100 lb  Given information on [employer] and [employer]  Patient needs to restrict calories and increase exercise    Patient is going on a cruise in 2022 and this is his goal            Other Visit Diagnoses     Prostate cancer screening        Relevant Orders    PSA, Total Screen    Screen for colon cancer        Relevant Orders    Ambulatory referral to Colorectal Surgery

## 2021-04-06 ENCOUNTER — RA CDI HCC (OUTPATIENT)
Dept: OTHER | Facility: HOSPITAL | Age: 66
End: 2021-04-06

## 2021-04-06 NOTE — PROGRESS NOTES
[facility]  coding oppertunities      [facility]  coding oppertunities             Chart reviewed, (number of) suggestions sent to provider: 1     Problem listed updated  Provider Accepted, (number of) suggestions accepted: 1                       Chart reviewed, (number of) suggestions sent to provider: 1                 I63  40Cerebral embolism with cerebral infarction [facility])  Please assess for current status or  From the correct coding standpoint, using the below code for personal history of stroke with no residual effects would be more appropriate:  Z86 73 Personal history of transient ischemic attack (TIA), and cerebral infarction without residual deficits

## 2021-05-20 DIAGNOSIS — G43.909 MIGRAINE SYNDROME: ICD-10-CM

## 2021-05-20 RX ORDER — PROPRANOLOL HCL 60 MG
60 CAPSULE, EXTENDED RELEASE 24HR ORAL DAILY
Qty: 90 CAPSULE | Refills: 3 | Status: SHIPPED | OUTPATIENT
Start: 2021-05-20 | End: 2022-05-09

## 2021-05-20 NOTE — TELEPHONE ENCOUNTER
PATIENT CALLED IN STATED HE WAS IN TO SEE Carlotta Morrison ON 03/08/2021 FOR HIS YEARLY VISIT & HIS MEDICATIONS WERE NOT CALLED IN  PATIENT WOULD LIKE TO KNOW IF HIS MEDICATION FOR "PROPRANOLOL" & "SILDENAFIL" TO BE CALLED IN TO [facility] IN [facility] 90 DAY SUPPLY   IF YOU HAVE ANY QUESTIONS, PLEASE CALL PATIENT [phone]

## 2021-05-20 NOTE — TELEPHONE ENCOUNTER
It looks like I called in only a #10/0 for the viagra  For some reason I can not see past Rxs to know what quantity he usually get for his "90" day on this med  Can we either verify with pharm or pt on this?

## 2021-07-06 ENCOUNTER — OFFICE VISIT (OUTPATIENT)
Dept: FAMILY MEDICINE CLINIC | Facility: CLINIC | Age: 66
End: 2021-07-06

## 2021-07-06 VITALS
HEIGHT: 72 IN | DIASTOLIC BLOOD PRESSURE: 62 MMHG | SYSTOLIC BLOOD PRESSURE: 102 MMHG | BODY MASS INDEX: 42.18 KG/M2 | HEART RATE: 60 BPM

## 2021-07-06 DIAGNOSIS — S89.92XD INJURY OF LEFT KNEE, SUBSEQUENT ENCOUNTER: Primary | ICD-10-CM

## 2021-07-06 DIAGNOSIS — R29.898 DIFFICULTY BEARING WEIGHT ON LEFT LOWER EXTREMITY: ICD-10-CM

## 2021-07-06 DIAGNOSIS — F52.21 ERECTILE DYSFUNCTION OF NON-ORGANIC ORIGIN: ICD-10-CM

## 2021-07-06 PROBLEM — S89.92XA LEFT KNEE INJURY: Status: ACTIVE | Noted: 2021-07-06

## 2021-07-06 PROCEDURE — 3288F FALL RISK ASSESSMENT DOCD: CPT | Performed by: PHYSICIAN ASSISTANT

## 2021-07-06 PROCEDURE — 1036F TOBACCO NON-USER: CPT | Performed by: PHYSICIAN ASSISTANT

## 2021-07-06 PROCEDURE — 99214 OFFICE O/P EST MOD 30 MIN: CPT | Performed by: PHYSICIAN ASSISTANT

## 2021-07-06 PROCEDURE — 1101F PT FALLS ASSESS-DOCD LE1/YR: CPT | Performed by: PHYSICIAN ASSISTANT

## 2021-07-06 RX ORDER — TRAMADOL HYDROCHLORIDE 50 MG/1
TABLET ORAL
COMMUNITY
Start: 2021-07-05 | End: 2022-03-09 | Stop reason: ALTCHOICE

## 2021-07-06 RX ORDER — SILDENAFIL 100 MG/1
100 TABLET, FILM COATED ORAL AS NEEDED
Qty: 30 TABLET | Refills: 1 | Status: SHIPPED | OUTPATIENT
Start: 2021-07-06

## 2021-07-06 RX ORDER — DIPHENHYDRAMINE HCL 25 MG
25 CAPSULE ORAL EVERY 6 HOURS PRN
COMMUNITY

## 2021-07-06 NOTE — PROGRESS NOTES
Assessment and Plan:    Problem List Items Addressed This Visit        Other    Erectile dysfunction of non-organic origin    Relevant Medications    sildenafil (Viagra) 100 mg tablet    Left knee injury - Primary      Seen at [facility] x-ray within normal limits except for some fusion  Patient unable to bear week and most likely has some internal derangement  MRI knee ordered  Call with results  Given information for [facility]  Relevant Orders    MRI knee left  wo contrast    Ambulatory referral to Orthopedic Surgery      Other Visit Diagnoses     Difficulty bearing weight on left lower extremity        Relevant Orders    MRI knee left  wo contrast    Ambulatory referral to Orthopedic Surgery                 Diagnoses and all orders for this visit:    Injury of left knee, subsequent encounter  -     MRI knee left  wo contrast; Future  -     Ambulatory referral to Orthopedic Surgery; Future    Difficulty bearing weight on left lower extremity  -     MRI knee left  wo contrast; Future  -     Ambulatory referral to Orthopedic Surgery; Future    Erectile dysfunction of non-organic origin  -     sildenafil (Viagra) 100 mg tablet; Take 1 tablet (100 mg total) by mouth as needed for erectile dysfunction    Other orders  -     Diclofenac Sodium (VOLTAREN) 1 %; Apply 1 application topically 4 (four) times a day  -     diphenhydrAMINE (BENADRYL) 25 mg capsule; Take 25 mg by mouth every 6 (six) hours as needed  -     traMADol (ULTRAM) 50 mg tablet; take 1 tablet by mouth every 6 hours if needed for MODERATE PAIN ( PAIN SCALE 4-6 )              Subjective:      Patient ID: Roslyn Mederos is a 73 y o  male  CC:    Chief Complaint   Patient presents with    Knee Pain     patient states he was climing onto a piece of equipment at home and hurt it about 10 days ago, but was able to limp  2 days ago when he was going to sit down he felt a snap and can not put any weight on it at all   Patient went to urgent care yesterday xray showed nothing, was given Tramadol and Diclofenac gel  ak       HPI:    One week ago hurt his knee climbing into his tractor leading with his L leg  Was swollen  One week later, this past Sunday, he went to sit into a chair and half way to sitting her felt significant amount pain and since then has not been able to bear weight  Able to bend  Using wheelchair and crutches but he is not good at using the crutches as he is heavy with his weight and can not bear any weight on his L side  Using tylenol  Did see [facility] urgent care and got a topical gel and tramadol  Xray only showed mild swelling and OA  The following portions of the patient's history were reviewed and updated as appropriate: allergies, current medications, past family history, past medical history, past social history, past surgical history and problem list       Review of Systems   Constitutional: Negative  HENT: Negative  Eyes: Negative  Respiratory: Negative  Cardiovascular: Negative  Gastrointestinal: Negative  Endocrine: Negative  Genitourinary: Negative  Musculoskeletal: Negative  L knee pain and unable to bear weight  Skin: Negative  Allergic/Immunologic: Negative  Neurological: Negative  Hematological: Negative  Psychiatric/Behavioral: Negative  Data to review:       Objective:    Vitals:    07/06/21 1536   BP: 102/62   Pulse: 60   Height: 6' (1 829 m)        Physical Exam  Vitals and nursing note reviewed  Constitutional:       Appearance: Normal appearance  HENT:      Head: Normocephalic and atraumatic  Eyes:      General: Lids are normal       Conjunctiva/sclera: Conjunctivae normal       Pupils: Pupils are equal, round, and reactive to light  Cardiovascular:      Rate and Rhythm: Normal rate and regular rhythm  Heart sounds: Normal heart sounds     Pulmonary:      Effort: Pulmonary effort is normal       Breath sounds: Normal breath sounds  Musculoskeletal:      Left knee: Swelling present  Tenderness present over the medial joint line, lateral joint line, MCL and LCL  Comments: Pt  unable to bear weight in L LE  In a wheelchair  Some joint laxity noted in L knee with edema  NO ecchymosis  Skin:     General: Skin is warm and dry  Neurological:      General: No focal deficit present  Mental Status: He is alert  Mental status is at baseline  Psychiatric:         Mood and Affect: Mood normal          Behavior: Behavior normal          Thought Content:  Thought content normal          Judgment: Judgment normal

## 2021-07-06 NOTE — PATIENT INSTRUCTIONS
Problem List Items Addressed This Visit        Other    Left knee injury - Primary      Seen at [facility] x-ray within normal limits except for some fusion  Patient unable to bear week and most likely has some internal derangement  MRI knee ordered  Call with results  Given information for [facility]             Other Visit Diagnoses     Difficulty bearing weight on left lower extremity

## 2021-07-06 NOTE — ASSESSMENT & PLAN NOTE
Seen at [facility] x-ray within normal limits except for some fusion  Patient unable to bear week and most likely has some internal derangement  MRI knee ordered  Call with results  Given information for [facility]

## 2021-07-13 ENCOUNTER — HOSPITAL ENCOUNTER (OUTPATIENT)
Dept: MRI IMAGING | Facility: HOSPITAL | Age: 66
Discharge: HOME | End: 2021-07-13
Payer: COMMERCIAL

## 2021-07-13 DIAGNOSIS — S89.92XD INJURY OF LEFT KNEE, SUBSEQUENT ENCOUNTER: ICD-10-CM

## 2021-07-13 DIAGNOSIS — R29.898 DIFFICULTY BEARING WEIGHT ON LEFT LOWER EXTREMITY: ICD-10-CM

## 2021-07-13 PROCEDURE — 73721 MRI JNT OF LWR EXTRE W/O DYE: CPT

## 2021-07-13 PROCEDURE — G1004 CDSM NDSC: HCPCS

## 2021-07-15 NOTE — RESULT ENCOUNTER NOTE
Please let the patient know that his MRI knee does show he has a tear in his medial meniscus  He should keep ortho apt he already has for 7/21 or he can call and now that he has a diagnosis can see if he can move apt up  Marques Cabrera

## 2021-07-21 ENCOUNTER — APPOINTMENT (OUTPATIENT)
Dept: RADIOLOGY | Facility: MEDICAL CENTER | Age: 66
End: 2021-07-21
Payer: COMMERCIAL

## 2021-07-21 ENCOUNTER — OFFICE VISIT (OUTPATIENT)
Dept: OBGYN CLINIC | Facility: MEDICAL CENTER | Age: 66
End: 2021-07-21
Payer: COMMERCIAL

## 2021-07-21 VITALS
SYSTOLIC BLOOD PRESSURE: 118 MMHG | WEIGHT: 300.2 LBS | HEIGHT: 72 IN | HEART RATE: 80 BPM | BODY MASS INDEX: 40.66 KG/M2 | DIASTOLIC BLOOD PRESSURE: 76 MMHG

## 2021-07-21 DIAGNOSIS — M17.12 ARTHRITIS OF LEFT KNEE: ICD-10-CM

## 2021-07-21 DIAGNOSIS — Z01.89 ENCOUNTER FOR LOWER EXTREMITY COMPARISON IMAGING STUDY: ICD-10-CM

## 2021-07-21 DIAGNOSIS — R29.898 DIFFICULTY BEARING WEIGHT ON LEFT LOWER EXTREMITY: ICD-10-CM

## 2021-07-21 DIAGNOSIS — S83.242A ACUTE MEDIAL MENISCAL TEAR, LEFT, INITIAL ENCOUNTER: ICD-10-CM

## 2021-07-21 DIAGNOSIS — M17.12 ARTHRITIS OF LEFT KNEE: Primary | ICD-10-CM

## 2021-07-21 DIAGNOSIS — S89.92XD INJURY OF LEFT KNEE, SUBSEQUENT ENCOUNTER: ICD-10-CM

## 2021-07-21 PROCEDURE — 73560 X-RAY EXAM OF KNEE 1 OR 2: CPT

## 2021-07-21 PROCEDURE — 1036F TOBACCO NON-USER: CPT | Performed by: ORTHOPAEDIC SURGERY

## 2021-07-21 PROCEDURE — 99243 OFF/OP CNSLTJ NEW/EST LOW 30: CPT | Performed by: ORTHOPAEDIC SURGERY

## 2021-07-21 PROCEDURE — 3008F BODY MASS INDEX DOCD: CPT | Performed by: ORTHOPAEDIC SURGERY

## 2021-07-21 PROCEDURE — 73564 X-RAY EXAM KNEE 4 OR MORE: CPT

## 2021-07-21 NOTE — LETTER
July 21, 2021     Ericka Rivera, [address] [facility] [id number] [facility]  [place]   [phone]  [phone]    Patient: Anais Vail   YOB: 1955   Date of Visit: 7/21/2021       Dear Dr Cecilio Sewell: Thank you for referring Anais Vail to me for evaluation  Below are my notes for this consultation  If you have questions, please do not hesitate to call me  I look forward to following your patient along with you  Sincerely,        Ryland Mason DO        CC: No Recipients  Ryland Mason DO  7/21/2021 10:06 AM  Sign when Signing Visit   Assessment/Plan     1  Arthritis of left knee    2  Injury of left knee, subsequent encounter    3  Difficulty bearing weight on left lower extremity    4  Encounter for lower extremity comparison imaging study    5  Acute medial meniscal tear, left, initial encounter      Orders Placed This Encounter   Procedures    XR knee 4+ vw left injury    XR knee 1 or 2 vw right    Ambulatory referral to Physical Therapy     · Patient has moderate left knee osteoarthritis and medial meniscal tear  · Discussed with patient conservative treatments: steroid injections, physical therapy, medications, bracing, visco supplementation injections, weight loss  · Continue taking Tylenol up to 3000 mg a day as needed for pain  Can not take NSAIDS due to being on Xarelto  · May use OTC knee compression brace as needed for comfort   · Will hold off on steroid injection today due to feeling better  · Physical therapy order was given out today   · May use over the counter Voltaren gel as needed for pain   · If pain persists, consider left knee steroid injection at the next office visit   Return if symptoms worsen or fail to improve  I answered all of the patient's questions during the visit and provided education of the patient's condition during the visit  The patient verbalized understanding of the information given and agrees with the plan    This note was dictated using M*Modal software  It may contain errors including improperly dictated words  Please contact physician directly for any questions  History of Present Illness   Chief complaint:   Chief Complaint   Patient presents with    Left Knee - Pain       HPI: Keron Rincon is a 73 y o  male that c/o left knee pain  She was referred by Nelida Mcmillan PA-C  He states about three weeks ago he felt a pop in  his left knee while climbing o'clock min and then a week ago he went from a standing to sitting position and felt something shift in his left knee  He states he was unable to bear weight at that time and fell his knee was unstable  He did go to [facility] [address] and had x-rays taken of the left knee show no acute fractures  He was prescribed Tylenol No  3 and also was provided with crutches  He states he rested for a week and iced and elevated and minimized his weight-bearing and states he has no pain at this time  He was seen by his PCP and had MRI left knee ordered which showed medial meniscal tear and osteoarthritis  He states he has constant stiffness but achy pain that comes and goes generalized left knee  He denies any knee instability at this time  Pain is worse with deep knee bending  Patient is taking Tylenol as needed for pain with relief  Patient can not take NSAIDs due to being on Xarelto  Patient has no history having any injections, physical therapy or surgeries on the left knee  ROS:    See HPI for musculoskeletal review  All other systems reviewed are negative     Historical Information   Past Medical History:   Diagnosis Date    Acute renal insufficiency     LAST ASSESSED: [id number]    Transient cerebral ischemia 05/16/2009    Vitamin D deficiency     LAST ASSESSED: [id number]     No past surgical history on file    Social History   Social History     Substance and Sexual Activity   Alcohol Use Yes    Comment: SOCIAL     Social History     Substance and Sexual Activity   Drug Use Never     Social History     Tobacco Use   Smoking Status Never Smoker   Smokeless Tobacco Never Used   Tobacco Comment    NO SECONDHAND SMOKE EXPOSURE     Family History:   Family History   Problem Relation Age of Onset    Arthritis Mother     Arthritis Father        Current Outpatient Medications on File Prior to Visit   Medication Sig Dispense Refill    Ascorbic Acid (VITAMIN C PO) Take by mouth      aspirin 81 MG tablet Take by mouth      Cholecalciferol 1000 units CHEW Chew      Coconut Oil 1000 MG CAPS Take 1 capsule by mouth daily      Diclofenac Sodium (VOLTAREN) 1 % Apply 1 application topically 4 (four) times a day      diphenhydrAMINE (BENADRYL) 25 mg capsule Take 25 mg by mouth every 6 (six) hours as needed      glucosamine-chondroitin 500-400 MG tablet Take 1 tablet by mouth      ipratropium (ATROVENT) 0 06 % nasal spray       Menatetrenone (Vitamin K2) 100 MCG TABS Take by mouth      Misc Natural Products (PUMPKIN SEED OIL) CAPS Take by mouth      Multiple Vitamin (MULTIVITAMIN) capsule Take by mouth      propranolol (INDERAL LA) 60 mg 24 hr capsule Take 1 capsule (60 mg total) by mouth daily 90 capsule 3    Red Yeast Rice 600 MG CAPS Take by mouth      rivaroxaban (XARELTO) 20 mg tablet Take 20 mg by mouth      sildenafil (Viagra) 100 mg tablet Take 1 tablet (100 mg total) by mouth as needed for erectile dysfunction 30 tablet 1    traMADol (ULTRAM) 50 mg tablet take 1 tablet by mouth every 6 hours if needed for MODERATE PAIN ( PAIN SCALE 4-6 )      vitamin E 100 UNIT capsule Take 100 Units by mouth daily (Patient not taking: Reported on 7/6/2021)      Zinc 30 MG CAPS Take by mouth       No current facility-administered medications on file prior to visit       No Known Allergies    Current Outpatient Medications on File Prior to Visit   Medication Sig Dispense Refill    Ascorbic Acid (VITAMIN C PO) Take by mouth      aspirin 81 MG tablet Take by mouth      Cholecalciferol 1000 units CHEW Chew      Coconut Oil 1000 MG CAPS Take 1 capsule by mouth daily      Diclofenac Sodium (VOLTAREN) 1 % Apply 1 application topically 4 (four) times a day      diphenhydrAMINE (BENADRYL) 25 mg capsule Take 25 mg by mouth every 6 (six) hours as needed      glucosamine-chondroitin 500-400 MG tablet Take 1 tablet by mouth      ipratropium (ATROVENT) 0 06 % nasal spray       Menatetrenone (Vitamin K2) 100 MCG TABS Take by mouth      Misc Natural Products (PUMPKIN SEED OIL) CAPS Take by mouth      Multiple Vitamin (MULTIVITAMIN) capsule Take by mouth      propranolol (INDERAL LA) 60 mg 24 hr capsule Take 1 capsule (60 mg total) by mouth daily 90 capsule 3    Red Yeast Rice 600 MG CAPS Take by mouth      rivaroxaban (XARELTO) 20 mg tablet Take 20 mg by mouth      sildenafil (Viagra) 100 mg tablet Take 1 tablet (100 mg total) by mouth as needed for erectile dysfunction 30 tablet 1    traMADol (ULTRAM) 50 mg tablet take 1 tablet by mouth every 6 hours if needed for MODERATE PAIN ( PAIN SCALE 4-6 )      vitamin E 100 UNIT capsule Take 100 Units by mouth daily (Patient not taking: Reported on 7/6/2021)      Zinc 30 MG CAPS Take by mouth       No current facility-administered medications on file prior to visit  Objective   Vitals: Blood pressure 118/76, pulse 80, height 6' (1 829 m), weight (!) 136 kg (300 lb 3 2 oz)  ,Body mass index is 40 71 kg/m²      PE:  AAOx 3  WDWN  Hearing intact, no drainage from eyes  Regular rate  no audible wheezing  no abdominal distension  LE compartments soft, skin intact    leftknee:    Appearance:  no swelling   No ecchymosis  no obvious joint deformity   No effusion  3 cm scar over the patella   Palpation/Tenderness:  No TTP over medial joint line  No TTP over lateral joint line   No TTP over patella  No TTP over patellar tendon  No TTP over pes anserine bursa  Active Range of Motion:  AROM: 0-130  PROM: Full   Crepitus with ROM   Special Tests:  Medial Zhao's Test:  Negative   Lateral Zhao's Test:  Negative  Apley's compression test:  Negative  Lachman's Test:  negative  Anterior and Posterior Drawer Test:  Negative  Patellar grind:  Negative  Valgus Stress Test:  negative  Varus Stress Test:  negative   No ipsilateral hip pain with ROM    leftLE:    Sensation grossly intact L4, S1   Palpable posterior tibial  pulse  AT/GS intact    Imaging Studies: I have personally reviewed pertinent films in PACS  leftknee:   Moderate DJD, chondral calcinosis on x-rays right knee AP view   MRI Left knee: medial meniscal tear  And osteoarthritis       Scribe Attestation    I,:  Matilda Bower am acting as a scribe while in the presence of the attending physician :       I,:  Harini Nichole DO personally performed the services described in this documentation    as scribed in my presence :

## 2021-07-21 NOTE — PROGRESS NOTES
Assessment/Plan     1  Arthritis of left knee    2  Injury of left knee, subsequent encounter    3  Difficulty bearing weight on left lower extremity    4  Encounter for lower extremity comparison imaging study    5  Acute medial meniscal tear, left, initial encounter      Orders Placed This Encounter   Procedures    XR knee 4+ vw left injury    XR knee 1 or 2 vw right    Ambulatory referral to Physical Therapy     · Patient has moderate left knee osteoarthritis and medial meniscal tear  · Discussed with patient conservative treatments: steroid injections, physical therapy, medications, bracing, visco supplementation injections, weight loss  · Continue taking Tylenol up to 3000 mg a day as needed for pain  Can not take NSAIDS due to being on Xarelto  · May use OTC knee compression brace as needed for comfort   · Will hold off on steroid injection today due to feeling better  · Physical therapy order was given out today   · May use over the counter Voltaren gel as needed for pain   · If pain persists, consider left knee steroid injection at the next office visit   Return if symptoms worsen or fail to improve  I answered all of the patient's questions during the visit and provided education of the patient's condition during the visit  The patient verbalized understanding of the information given and agrees with the plan  This note was dictated using [employer] software  It may contain errors including improperly dictated words  Please contact physician directly for any questions  History of Present Illness   Chief complaint:   Chief Complaint   Patient presents with    Left Knee - Pain       HPI: Maris Aguilar is a 73 y o  male that c/o left knee pain  She was referred by Hernandez Vivar PA-C  He states about three weeks ago he felt a pop in  his left knee while climbing o'clock min and then a week ago he went from a standing to sitting position and felt something shift in his left knee    He states he was unable to bear weight at that time and fell his knee was unstable  He did go to [facility] [address] and had x-rays taken of the left knee show no acute fractures  He was prescribed Tylenol No  3 and also was provided with crutches  He states he rested for a week and iced and elevated and minimized his weight-bearing and states he has no pain at this time  He was seen by his PCP and had MRI left knee ordered which showed medial meniscal tear and osteoarthritis  He states he has constant stiffness but achy pain that comes and goes generalized left knee  He denies any knee instability at this time  Pain is worse with deep knee bending  Patient is taking Tylenol as needed for pain with relief  Patient can not take NSAIDs due to being on Xarelto  Patient has no history having any injections, physical therapy or surgeries on the left knee  ROS:    See HPI for musculoskeletal review  All other systems reviewed are negative     Historical Information   Past Medical History:   Diagnosis Date    Acute renal insufficiency     LAST ASSESSED: [id number]    Transient cerebral ischemia 05/16/2009    Vitamin D deficiency     LAST ASSESSED: [id number]     No past surgical history on file    Social History   Social History     Substance and Sexual Activity   Alcohol Use Yes    Comment: SOCIAL     Social History     Substance and Sexual Activity   Drug Use Never     Social History     Tobacco Use   Smoking Status Never Smoker   Smokeless Tobacco Never Used   Tobacco Comment    NO SECONDHAND SMOKE EXPOSURE     Family History:   Family History   Problem Relation Age of Onset    Arthritis Mother     Arthritis Father        Current Outpatient Medications on File Prior to Visit   Medication Sig Dispense Refill    Ascorbic Acid (VITAMIN C PO) Take by mouth      aspirin 81 MG tablet Take by mouth      Cholecalciferol 1000 units CHEW Chew      Coconut Oil 1000 MG CAPS Take 1 capsule by mouth daily      Diclofenac Sodium (VOLTAREN) 1 % Apply 1 application topically 4 (four) times a day      diphenhydrAMINE (BENADRYL) 25 mg capsule Take 25 mg by mouth every 6 (six) hours as needed      glucosamine-chondroitin 500-400 MG tablet Take 1 tablet by mouth      ipratropium (ATROVENT) 0 06 % nasal spray       Menatetrenone (Vitamin K2) 100 MCG TABS Take by mouth      Misc Natural Products (PUMPKIN SEED OIL) CAPS Take by mouth      Multiple Vitamin (MULTIVITAMIN) capsule Take by mouth      propranolol (INDERAL LA) 60 mg 24 hr capsule Take 1 capsule (60 mg total) by mouth daily 90 capsule 3    Red Yeast Rice 600 MG CAPS Take by mouth      rivaroxaban (XARELTO) 20 mg tablet Take 20 mg by mouth      sildenafil (Viagra) 100 mg tablet Take 1 tablet (100 mg total) by mouth as needed for erectile dysfunction 30 tablet 1    traMADol (ULTRAM) 50 mg tablet take 1 tablet by mouth every 6 hours if needed for MODERATE PAIN ( PAIN SCALE 4-6 )      vitamin E 100 UNIT capsule Take 100 Units by mouth daily (Patient not taking: Reported on 7/6/2021)      Zinc 30 MG CAPS Take by mouth       No current facility-administered medications on file prior to visit       No Known Allergies    Current Outpatient Medications on File Prior to Visit   Medication Sig Dispense Refill    Ascorbic Acid (VITAMIN C PO) Take by mouth      aspirin 81 MG tablet Take by mouth      Cholecalciferol 1000 units CHEW Chew      Coconut Oil 1000 MG CAPS Take 1 capsule by mouth daily      Diclofenac Sodium (VOLTAREN) 1 % Apply 1 application topically 4 (four) times a day      diphenhydrAMINE (BENADRYL) 25 mg capsule Take 25 mg by mouth every 6 (six) hours as needed      glucosamine-chondroitin 500-400 MG tablet Take 1 tablet by mouth      ipratropium (ATROVENT) 0 06 % nasal spray       Menatetrenone (Vitamin K2) 100 MCG TABS Take by mouth      Misc Natural Products (PUMPKIN SEED OIL) CAPS Take by mouth      Multiple Vitamin (MULTIVITAMIN) capsule Take by mouth      propranolol (INDERAL LA) 60 mg 24 hr capsule Take 1 capsule (60 mg total) by mouth daily 90 capsule 3    Red Yeast Rice 600 MG CAPS Take by mouth      rivaroxaban (XARELTO) 20 mg tablet Take 20 mg by mouth      sildenafil (Viagra) 100 mg tablet Take 1 tablet (100 mg total) by mouth as needed for erectile dysfunction 30 tablet 1    traMADol (ULTRAM) 50 mg tablet take 1 tablet by mouth every 6 hours if needed for MODERATE PAIN ( PAIN SCALE 4-6 )      vitamin E 100 UNIT capsule Take 100 Units by mouth daily (Patient not taking: Reported on 7/6/2021)      Zinc 30 MG CAPS Take by mouth       No current facility-administered medications on file prior to visit  Objective   Vitals: Blood pressure 118/76, pulse 80, height 6' (1 829 m), weight (!) 136 kg (300 lb 3 2 oz)  ,Body mass index is 40 71 kg/m²  PE:  AAOx 3  WDWN  Hearing intact, no drainage from eyes  Regular rate  no audible wheezing  no abdominal distension  LE compartments soft, skin intact    leftknee:    Appearance:  no swelling   No ecchymosis  no obvious joint deformity   No effusion  3 cm scar over the patella   Palpation/Tenderness:  No TTP over medial joint line  No TTP over lateral joint line   No TTP over patella  No TTP over patellar tendon  No TTP over pes anserine bursa  Active Range of Motion:  AROM: 0-130  PROM: Full   Crepitus with ROM   Special Tests:  Medial Zhao's Test:  Negative   Lateral Zhao's Test:  Negative  Apley's compression test:  Negative  Lachman's Test:  negative  Anterior and Posterior Drawer Test:  Negative  Patellar grind:  Negative  Valgus Stress Test:  negative  Varus Stress Test:  negative   No ipsilateral hip pain with ROM    leftLE:    Sensation grossly intact L4, S1   Palpable posterior tibial  pulse  AT/GS intact    Imaging Studies: I have personally reviewed pertinent films in PACS  leftknee:   Moderate DJD, chondral calcinosis on x-rays right knee AP view   MRI Left knee: medial meniscal tear  And osteoarthritis       Scribe Attestation    I,:  Farrukh Farrell am acting as a scribe while in the presence of the attending physician :       I,:  Brooklyn Manzano DO personally performed the services described in this documentation    as scribed in my presence :

## 2021-11-19 ENCOUNTER — OFFICE VISIT (OUTPATIENT)
Dept: FAMILY MEDICINE CLINIC | Facility: CLINIC | Age: 66
End: 2021-11-19
Payer: MEDICARE

## 2021-11-19 ENCOUNTER — APPOINTMENT (EMERGENCY)
Dept: CT IMAGING | Facility: HOSPITAL | Age: 66
DRG: 123 | End: 2021-11-19
Payer: MEDICARE

## 2021-11-19 ENCOUNTER — HOSPITAL ENCOUNTER (INPATIENT)
Facility: HOSPITAL | Age: 66
LOS: 1 days | Discharge: HOME | DRG: 123 | End: 2021-11-20
Attending: EMERGENCY MEDICINE | Admitting: INTERNAL MEDICINE
Payer: MEDICARE

## 2021-11-19 VITALS
BODY MASS INDEX: 41.5 KG/M2 | TEMPERATURE: 96.9 F | DIASTOLIC BLOOD PRESSURE: 72 MMHG | SYSTOLIC BLOOD PRESSURE: 118 MMHG | HEIGHT: 72 IN | WEIGHT: 306.38 LBS

## 2021-11-19 DIAGNOSIS — H49.22 LEFT ABDUCENS NERVE PALSY: ICD-10-CM

## 2021-11-19 DIAGNOSIS — H49.22 LEFT ABDUCENS NERVE PALSY: Primary | ICD-10-CM

## 2021-11-19 DIAGNOSIS — H53.2 DIPLOPIA: Primary | ICD-10-CM

## 2021-11-19 LAB
2HR DELTA HS TROPONIN: 0 NG/L
4HR DELTA HS TROPONIN: 1 NG/L
ALBUMIN SERPL BCP-MCNC: 3.2 G/DL (ref 3.5–5)
ALP SERPL-CCNC: 84 U/L (ref 46–116)
ALT SERPL W P-5'-P-CCNC: 30 U/L (ref 12–78)
ANION GAP SERPL CALCULATED.3IONS-SCNC: 6 MMOL/L (ref 4–13)
APTT PPP: 37 SECONDS (ref 23–37)
AST SERPL W P-5'-P-CCNC: 19 U/L (ref 5–45)
ATRIAL RATE: 288 BPM
BASOPHILS # BLD AUTO: 0.03 THOUSANDS/ΜL (ref 0–0.1)
BASOPHILS NFR BLD AUTO: 0 % (ref 0–1)
BILIRUB SERPL-MCNC: 0.4 MG/DL (ref 0.2–1)
BUN SERPL-MCNC: 24 MG/DL (ref 5–25)
CALCIUM ALBUM COR SERPL-MCNC: 9.2 MG/DL (ref 8.3–10.1)
CALCIUM SERPL-MCNC: 8.6 MG/DL (ref 8.3–10.1)
CARDIAC TROPONIN I PNL SERPL HS: 5 NG/L
CARDIAC TROPONIN I PNL SERPL HS: 5 NG/L
CARDIAC TROPONIN I PNL SERPL HS: 6 NG/L
CHLORIDE SERPL-SCNC: 104 MMOL/L (ref 100–108)
CO2 SERPL-SCNC: 32 MMOL/L (ref 21–32)
CREAT SERPL-MCNC: 1.15 MG/DL (ref 0.6–1.3)
CRP SERPL QL: 5 MG/L
EOSINOPHIL # BLD AUTO: 0.21 THOUSAND/ΜL (ref 0–0.61)
EOSINOPHIL NFR BLD AUTO: 3 % (ref 0–6)
ERYTHROCYTE [DISTWIDTH] IN BLOOD BY AUTOMATED COUNT: 12.6 % (ref 11.6–15.1)
GFR SERPL CREATININE-BSD FRML MDRD: 66 ML/MIN/1.73SQ M
GLUCOSE SERPL-MCNC: 112 MG/DL (ref 65–140)
HCT VFR BLD AUTO: 44.7 % (ref 36.5–49.3)
HGB BLD-MCNC: 15 G/DL (ref 12–17)
IMM GRANULOCYTES # BLD AUTO: 0.03 THOUSAND/UL (ref 0–0.2)
IMM GRANULOCYTES NFR BLD AUTO: 0 % (ref 0–2)
INR PPP: 1.51 (ref 0.84–1.19)
LYMPHOCYTES # BLD AUTO: 1.42 THOUSANDS/ΜL (ref 0.6–4.47)
LYMPHOCYTES NFR BLD AUTO: 18 % (ref 14–44)
MCH RBC QN AUTO: 32.6 PG (ref 26.8–34.3)
MCHC RBC AUTO-ENTMCNC: 33.6 G/DL (ref 31.4–37.4)
MCV RBC AUTO: 97 FL (ref 82–98)
MONOCYTES # BLD AUTO: 0.76 THOUSAND/ΜL (ref 0.17–1.22)
MONOCYTES NFR BLD AUTO: 10 % (ref 4–12)
NEUTROPHILS # BLD AUTO: 5.25 THOUSANDS/ΜL (ref 1.85–7.62)
NEUTS SEG NFR BLD AUTO: 69 % (ref 43–75)
NRBC BLD AUTO-RTO: 0 /100 WBCS
PLATELET # BLD AUTO: 182 THOUSANDS/UL (ref 149–390)
PMV BLD AUTO: 9.7 FL (ref 8.9–12.7)
POTASSIUM SERPL-SCNC: 3.8 MMOL/L (ref 3.5–5.3)
PROT SERPL-MCNC: 7 G/DL (ref 6.4–8.2)
PROTHROMBIN TIME: 17.6 SECONDS (ref 11.6–14.5)
QRS AXIS: 83 DEGREES
QRSD INTERVAL: 78 MS
QT INTERVAL: 356 MS
QTC INTERVAL: 413 MS
RBC # BLD AUTO: 4.6 MILLION/UL (ref 3.88–5.62)
SODIUM SERPL-SCNC: 142 MMOL/L (ref 136–145)
T WAVE AXIS: 48 DEGREES
TSH SERPL DL<=0.05 MIU/L-ACNC: 0.76 UIU/ML (ref 0.36–3.74)
VENTRICULAR RATE: 81 BPM
WBC # BLD AUTO: 7.7 THOUSAND/UL (ref 4.31–10.16)

## 2021-11-19 PROCEDURE — 93005 ELECTROCARDIOGRAM TRACING: CPT

## 2021-11-19 PROCEDURE — 93010 ELECTROCARDIOGRAM REPORT: CPT | Performed by: INTERNAL MEDICINE

## 2021-11-19 PROCEDURE — 85730 THROMBOPLASTIN TIME PARTIAL: CPT | Performed by: PHYSICIAN ASSISTANT

## 2021-11-19 PROCEDURE — 85025 COMPLETE CBC W/AUTO DIFF WBC: CPT | Performed by: PHYSICIAN ASSISTANT

## 2021-11-19 PROCEDURE — 83090 ASSAY OF HOMOCYSTEINE: CPT | Performed by: INTERNAL MEDICINE

## 2021-11-19 PROCEDURE — 85610 PROTHROMBIN TIME: CPT | Performed by: PHYSICIAN ASSISTANT

## 2021-11-19 PROCEDURE — 84443 ASSAY THYROID STIM HORMONE: CPT | Performed by: PHYSICIAN ASSISTANT

## 2021-11-19 PROCEDURE — 70496 CT ANGIOGRAPHY HEAD: CPT

## 2021-11-19 PROCEDURE — 99213 OFFICE O/P EST LOW 20 MIN: CPT | Performed by: NURSE PRACTITIONER

## 2021-11-19 PROCEDURE — 99223 1ST HOSP IP/OBS HIGH 75: CPT | Performed by: INTERNAL MEDICINE

## 2021-11-19 PROCEDURE — 99285 EMERGENCY DEPT VISIT HI MDM: CPT | Performed by: PHYSICIAN ASSISTANT

## 2021-11-19 PROCEDURE — 70498 CT ANGIOGRAPHY NECK: CPT

## 2021-11-19 PROCEDURE — 1124F ACP DISCUSS-NO DSCNMKR DOCD: CPT | Performed by: PHYSICIAN ASSISTANT

## 2021-11-19 PROCEDURE — 86140 C-REACTIVE PROTEIN: CPT | Performed by: INTERNAL MEDICINE

## 2021-11-19 PROCEDURE — 99285 EMERGENCY DEPT VISIT HI MDM: CPT

## 2021-11-19 PROCEDURE — 80053 COMPREHEN METABOLIC PANEL: CPT | Performed by: PHYSICIAN ASSISTANT

## 2021-11-19 PROCEDURE — 36415 COLL VENOUS BLD VENIPUNCTURE: CPT | Performed by: PHYSICIAN ASSISTANT

## 2021-11-19 PROCEDURE — 84484 ASSAY OF TROPONIN QUANT: CPT | Performed by: PHYSICIAN ASSISTANT

## 2021-11-19 RX ORDER — ASPIRIN 81 MG/1
81 TABLET, CHEWABLE ORAL DAILY
Status: DISCONTINUED | OUTPATIENT
Start: 2021-11-20 | End: 2021-11-20 | Stop reason: HOSPADM

## 2021-11-19 RX ORDER — ATORVASTATIN CALCIUM 40 MG/1
40 TABLET, FILM COATED ORAL EVERY EVENING
Status: DISCONTINUED | OUTPATIENT
Start: 2021-11-19 | End: 2021-11-20 | Stop reason: HOSPADM

## 2021-11-19 RX ADMIN — IOHEXOL 85 ML: 350 INJECTION, SOLUTION INTRAVENOUS at 17:23

## 2021-11-19 RX ADMIN — RIVAROXABAN 20 MG: 20 TABLET, FILM COATED ORAL at 21:54

## 2021-11-20 ENCOUNTER — APPOINTMENT (INPATIENT)
Dept: MRI IMAGING | Facility: HOSPITAL | Age: 66
DRG: 123 | End: 2021-11-20
Payer: MEDICARE

## 2021-11-20 ENCOUNTER — APPOINTMENT (INPATIENT)
Dept: NON INVASIVE DIAGNOSTICS | Facility: HOSPITAL | Age: 66
DRG: 123 | End: 2021-11-20
Payer: MEDICARE

## 2021-11-20 VITALS
TEMPERATURE: 97.8 F | HEIGHT: 72 IN | WEIGHT: 309.75 LBS | BODY MASS INDEX: 41.95 KG/M2 | DIASTOLIC BLOOD PRESSURE: 84 MMHG | SYSTOLIC BLOOD PRESSURE: 125 MMHG | RESPIRATION RATE: 18 BRPM | OXYGEN SATURATION: 96 % | HEART RATE: 81 BPM

## 2021-11-20 LAB
CHOLEST SERPL-MCNC: 148 MG/DL
EST. AVERAGE GLUCOSE BLD GHB EST-MCNC: 114 MG/DL
HBA1C MFR BLD: 5.6 %
HCYS SERPL-SCNC: 8.1 UMOL/L (ref 5.3–14.2)
HDLC SERPL-MCNC: 43 MG/DL
LDLC SERPL CALC-MCNC: 80 MG/DL (ref 0–100)
TRIGL SERPL-MCNC: 125 MG/DL

## 2021-11-20 PROCEDURE — 70543 MRI ORBT/FAC/NCK W/O &W/DYE: CPT

## 2021-11-20 PROCEDURE — 80061 LIPID PANEL: CPT | Performed by: INTERNAL MEDICINE

## 2021-11-20 PROCEDURE — 83036 HEMOGLOBIN GLYCOSYLATED A1C: CPT | Performed by: INTERNAL MEDICINE

## 2021-11-20 PROCEDURE — G1004 CDSM NDSC: HCPCS

## 2021-11-20 PROCEDURE — A9585 GADOBUTROL INJECTION: HCPCS | Performed by: INTERNAL MEDICINE

## 2021-11-20 PROCEDURE — 99239 HOSP IP/OBS DSCHRG MGMT >30: CPT | Performed by: HOSPITALIST

## 2021-11-20 PROCEDURE — 99223 1ST HOSP IP/OBS HIGH 75: CPT | Performed by: PSYCHIATRY & NEUROLOGY

## 2021-11-20 PROCEDURE — 70553 MRI BRAIN STEM W/O & W/DYE: CPT

## 2021-11-20 RX ADMIN — GADOBUTROL 14 ML: 604.72 INJECTION INTRAVENOUS at 09:29

## 2021-11-20 RX ADMIN — ASPIRIN 81 MG CHEWABLE TABLET 81 MG: 81 TABLET CHEWABLE at 10:01

## 2021-11-21 ENCOUNTER — TELEPHONE (OUTPATIENT)
Dept: FAMILY MEDICINE CLINIC | Facility: CLINIC | Age: 66
End: 2021-11-21

## 2021-11-23 ENCOUNTER — TRANSITIONAL CARE MANAGEMENT (OUTPATIENT)
Dept: FAMILY MEDICINE CLINIC | Facility: CLINIC | Age: 66
End: 2021-11-23

## 2021-11-26 ENCOUNTER — TELEPHONE (OUTPATIENT)
Dept: FAMILY MEDICINE CLINIC | Facility: CLINIC | Age: 66
End: 2021-11-26

## 2022-03-03 ENCOUNTER — RA CDI HCC (OUTPATIENT)
Dept: OTHER | Facility: HOSPITAL | Age: 67
End: 2022-03-03

## 2022-03-03 NOTE — PROGRESS NOTES
[facility] 76  coding opportunities       Chart reviewed, no opportunity found: CHART REVIEWED, NO OPPORTUNITY FOUND                        Patients insurance company: [employer]

## 2022-03-07 ENCOUNTER — APPOINTMENT (OUTPATIENT)
Dept: LAB | Facility: CLINIC | Age: 67
End: 2022-03-07
Payer: MEDICARE

## 2022-03-07 DIAGNOSIS — Z12.5 PROSTATE CANCER SCREENING: ICD-10-CM

## 2022-03-07 DIAGNOSIS — E55.9 VITAMIN D DEFICIENCY: ICD-10-CM

## 2022-03-07 DIAGNOSIS — R73.9 HYPERGLYCEMIA: ICD-10-CM

## 2022-03-07 DIAGNOSIS — E78.2 MIXED HYPERLIPIDEMIA: ICD-10-CM

## 2022-03-07 LAB
25(OH)D3 SERPL-MCNC: 63.9 NG/ML (ref 30–100)
ALBUMIN SERPL BCP-MCNC: 3.8 G/DL (ref 3.5–5)
ALP SERPL-CCNC: 81 U/L (ref 46–116)
ALT SERPL W P-5'-P-CCNC: 26 U/L (ref 12–78)
ANION GAP SERPL CALCULATED.3IONS-SCNC: 1 MMOL/L (ref 4–13)
AST SERPL W P-5'-P-CCNC: 18 U/L (ref 5–45)
BILIRUB SERPL-MCNC: 1.05 MG/DL (ref 0.2–1)
BUN SERPL-MCNC: 24 MG/DL (ref 5–25)
CALCIUM SERPL-MCNC: 9.4 MG/DL (ref 8.3–10.1)
CHLORIDE SERPL-SCNC: 103 MMOL/L (ref 100–108)
CHOLEST SERPL-MCNC: 174 MG/DL
CO2 SERPL-SCNC: 33 MMOL/L (ref 21–32)
CREAT SERPL-MCNC: 1.26 MG/DL (ref 0.6–1.3)
EST. AVERAGE GLUCOSE BLD GHB EST-MCNC: 111 MG/DL
GFR SERPL CREATININE-BSD FRML MDRD: 59 ML/MIN/1.73SQ M
GLUCOSE P FAST SERPL-MCNC: 97 MG/DL (ref 65–99)
HBA1C MFR BLD: 5.5 %
HDLC SERPL-MCNC: 52 MG/DL
LDLC SERPL CALC-MCNC: 96 MG/DL (ref 0–100)
POTASSIUM SERPL-SCNC: 5 MMOL/L (ref 3.5–5.3)
PROT SERPL-MCNC: 8.4 G/DL (ref 6.4–8.2)
PSA SERPL-MCNC: 1.3 NG/ML (ref 0–4)
SODIUM SERPL-SCNC: 137 MMOL/L (ref 136–145)
TRIGL SERPL-MCNC: 129 MG/DL

## 2022-03-07 PROCEDURE — 82306 VITAMIN D 25 HYDROXY: CPT

## 2022-03-07 PROCEDURE — G0103 PSA SCREENING: HCPCS

## 2022-03-07 PROCEDURE — 80053 COMPREHEN METABOLIC PANEL: CPT

## 2022-03-07 PROCEDURE — 36415 COLL VENOUS BLD VENIPUNCTURE: CPT

## 2022-03-07 PROCEDURE — 80061 LIPID PANEL: CPT

## 2022-03-07 PROCEDURE — 83036 HEMOGLOBIN GLYCOSYLATED A1C: CPT

## 2022-03-09 ENCOUNTER — OFFICE VISIT (OUTPATIENT)
Dept: FAMILY MEDICINE CLINIC | Facility: CLINIC | Age: 67
End: 2022-03-09
Payer: MEDICARE

## 2022-03-09 VITALS
WEIGHT: 303.6 LBS | BODY MASS INDEX: 41.12 KG/M2 | DIASTOLIC BLOOD PRESSURE: 60 MMHG | HEART RATE: 60 BPM | HEIGHT: 72 IN | TEMPERATURE: 97.1 F | SYSTOLIC BLOOD PRESSURE: 112 MMHG

## 2022-03-09 DIAGNOSIS — N18.31 STAGE 3A CHRONIC KIDNEY DISEASE (HCC): ICD-10-CM

## 2022-03-09 DIAGNOSIS — M54.16 LUMBAR RADICULOPATHY: ICD-10-CM

## 2022-03-09 DIAGNOSIS — E78.2 MIXED HYPERLIPIDEMIA: ICD-10-CM

## 2022-03-09 DIAGNOSIS — Z12.11 COLON CANCER SCREENING: ICD-10-CM

## 2022-03-09 DIAGNOSIS — E66.01 MORBID OBESITY (HCC): ICD-10-CM

## 2022-03-09 DIAGNOSIS — Z12.5 PROSTATE CANCER SCREENING: ICD-10-CM

## 2022-03-09 DIAGNOSIS — Z00.00 MEDICARE ANNUAL WELLNESS VISIT, INITIAL: ICD-10-CM

## 2022-03-09 DIAGNOSIS — G43.909 MIGRAINE SYNDROME: ICD-10-CM

## 2022-03-09 DIAGNOSIS — I48.20 CHRONIC ATRIAL FIBRILLATION (HCC): Primary | ICD-10-CM

## 2022-03-09 DIAGNOSIS — E55.9 VITAMIN D DEFICIENCY: ICD-10-CM

## 2022-03-09 DIAGNOSIS — F52.21 ERECTILE DYSFUNCTION OF NON-ORGANIC ORIGIN: ICD-10-CM

## 2022-03-09 DIAGNOSIS — R73.9 HYPERGLYCEMIA: ICD-10-CM

## 2022-03-09 DIAGNOSIS — Z12.11 SCREENING FOR COLON CANCER: ICD-10-CM

## 2022-03-09 PROCEDURE — G0402 INITIAL PREVENTIVE EXAM: HCPCS | Performed by: PHYSICIAN ASSISTANT

## 2022-03-09 PROCEDURE — 99214 OFFICE O/P EST MOD 30 MIN: CPT | Performed by: PHYSICIAN ASSISTANT

## 2022-03-09 NOTE — ASSESSMENT & PLAN NOTE
Patient is not on any prescription medication LDL stable at 96 goal is below 100 as he is status post stroke without residual symptoms

## 2022-03-09 NOTE — PROGRESS NOTES
Assessment and Plan:    Problem List Items Addressed This Visit        Cardiovascular and Mediastinum    Chronic atrial fibrillation ([facility] ) - Primary     Continue Xarelto  Relevant Orders    CBC and differential    Migraine syndrome     Continue propranolol  Nervous and Auditory    Lumbar radiculopathy     Check xray lumbar spine  Patient is complaining of a anterior right leg discomfort that wakes him up all throughout the night when he lays on his side if he lays on his back it goes away he does not have it during the day  Does not have any low back pain no loss of strength  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  Relevant Orders    XR spine lumbar minimum 4 views non injury       Genitourinary    Stage 3a chronic kidney disease ([facility] )     Lab Results   Component Value Date    EGFR 59 03/07/2022    EGFR 66 11/19/2021    EGFR 64 03/04/2021    CREATININE 1 26 03/07/2022    CREATININE 1 15 11/19/2021    CREATININE 1 19 03/04/2021               Other    Erectile dysfunction of non-organic origin     Viagra as needed  Hyperglycemia     Fasting sugar normal at 97 A1c 5 5 continue to observe yearly  Relevant Orders    Hemoglobin A1C    Comprehensive metabolic panel    Vitamin D deficiency     Vitamin-D is good at 63 continues to supplementation  Relevant Orders    Vitamin D 25 hydroxy    Mixed hyperlipidemia     Patient is not on any prescription medication LDL stable at 96 goal is below 100 as he is status post stroke without residual symptoms  Relevant Orders    Lipid Panel with Direct LDL reflex    Morbid obesity ([facility] )     Highly recommend decreasing caloric intake increasing activity to achieve weight loss over time  Long discussion how to lose weight and at the end every day the bottom line is fewer calories in then burned over time equals  weight loss           Medicare annual wellness visit, initial     Pack in 5 wishes given patient encouraged to bring in living will to put in his ACP document tap  First pneumonia vaccine is due  Up-to-date with COVID and shingles and blood work  Other Visit Diagnoses     Screening for colon cancer        Relevant Orders    Ambulatory Referral to Gastroenterology    Colon cancer screening        Relevant Orders    Ambulatory Referral to Colorectal Surgery    Prostate cancer screening        Relevant Orders    PSA, Total Screen                 Diagnoses and all orders for this visit:    Chronic atrial fibrillation (HCC)  -     CBC and differential; Future    Hyperglycemia  -     Hemoglobin A1C; Future  -     Comprehensive metabolic panel; Future    Mixed hyperlipidemia  -     Lipid Panel with Direct LDL reflex; Future    Vitamin D deficiency  -     Vitamin D 25 hydroxy; Future    Morbid obesity (HCC)    Migraine syndrome    Erectile dysfunction of non-organic origin    Screening for colon cancer  -     Ambulatory Referral to Gastroenterology; Future    Medicare annual wellness visit, initial    Colon cancer screening  -     Ambulatory Referral to Colorectal Surgery; Future    Stage 3a chronic kidney disease (HCC)    Lumbar radiculopathy  -     XR spine lumbar minimum 4 views non injury; Future    Prostate cancer screening  -     PSA, Total Screen; Future              Subjective:      Patient ID: Mariluz Thornton is a 73 y o  male  CC:    Chief Complaint   Patient presents with    Medicare Wellness Visit     welcome     Leg Problem     calf pain ongoing for awhile, states this happens at night  right leg  HPI:      Mariluz Thornton is here for chronic conditions f/u including the diagnosis of Chronic atrial fibrillation (hcc)  (primary encounter diagnosis)  Hyperglycemia  Mixed hyperlipidemia  Vitamin d deficiency  Morbid obesity (hcc)  Migraine syndrome  Erectile dysfunction of non-organic origin  Screening for colon cancer    Pt  states they are taking all medications as directed without complaints or side effects   Pt  had labs done prior to today's visit which included Recent Results (from the past 672 hour(s))  -PSA, Total Screen:   Collection Time: 03/07/22 11:21 AM       Result                      Value             Ref Range           PSA                         1 3               0 0 - 4 0 ng*  -Lipid Panel with Direct LDL reflex:   Collection Time: 03/07/22 11:21 AM       Result                      Value             Ref Range           Cholesterol                 174               See Comment *       Triglycerides               129               See Comment *       HDL, Direct                 52                >=40 mg/dL          LDL Calculated              96                0 - 100 mg/dL  -Hemoglobin A1C:   Collection Time: 03/07/22 11:21 AM       Result                      Value             Ref Range           Hemoglobin A1C              5 5               Normal 3 8-5*       EAG                         111               mg/dl          -Comprehensive metabolic panel:   Collection Time: 03/07/22 11:21 AM       Result                      Value             Ref Range           Sodium                      137               136 - 145 mm*       Potassium                   5 0               3 5 - 5 3 mm*       Chloride                    103               100 - 108 mm*       CO2                         33 (H)            21 - 32 mmol*       ANION GAP                   1 (L)             4 - 13 mmol/L       BUN                         24                5 - 25 mg/dL        Creatinine                  1 26              0 60 - 1 30 *       Glucose, Fasting            97                65 - 99 mg/dL       Calcium                     9 4               8 3 - 10 1 m*       AST                         18                5 - 45 U/L          ALT                         26                12 - 78 U/L         Alkaline Phosphatase        81                46 - 116 U/L        Total Protein               8 4 (H)           6 4 - 8 2 g/*       Albumin                     3 8               3 5 - 5 0 g/*       Total Bilirubin             1 05 (H)          0 20 - 1 00 *       eGFR                        59                ml/min/1 73s*  -Vitamin D 25 hydroxy:   Collection Time: 03/07/22 11:21 AM       Result                      Value             Ref Range           Vit D, 25-Hydroxy           63 9              30 0 - 100 0*        The following portions of the patient's history were reviewed and updated as appropriate: allergies, current medications, past family history, past medical history, past social history, past surgical history and problem list       Review of Systems   Constitutional: Negative  HENT: Negative  Eyes: Negative  Respiratory: Negative  Cardiovascular: Negative  Gastrointestinal: Negative  Endocrine: Negative  Genitourinary: Negative  Musculoskeletal: Negative  Skin: Negative  Allergic/Immunologic: Negative  Neurological: Negative  Hematological: Negative  Psychiatric/Behavioral: Negative  Data to review:       Objective:    Vitals:    03/09/22 0730   BP: 112/60   BP Location: Right arm   Patient Position: Sitting   Cuff Size: Large   Pulse: 60   Temp: (!) 97 1 °F (36 2 °C)   TempSrc: Temporal   Weight: (!) 138 kg (303 lb 9 6 oz)   Height: 6' (1 829 m)        Physical Exam  Vitals and nursing note reviewed  Constitutional:       Appearance: Normal appearance  HENT:      Head: Normocephalic and atraumatic  Eyes:      General: Lids are normal       Conjunctiva/sclera: Conjunctivae normal       Pupils: Pupils are equal, round, and reactive to light  Cardiovascular:      Rate and Rhythm: Normal rate and regular rhythm  Heart sounds: Normal heart sounds  Pulmonary:      Effort: Pulmonary effort is normal       Breath sounds: Normal breath sounds  Skin:     General: Skin is warm and dry     Neurological:      General: No focal deficit present  Mental Status: He is alert  Mental status is at baseline  Psychiatric:         Mood and Affect: Mood normal          Behavior: Behavior normal          Thought Content: Thought content normal          Judgment: Judgment normal            BMI Counseling: Body mass index is 41 18 kg/m²  The BMI is above normal  Nutrition recommendations include decreasing portion sizes, encouraging healthy choices of fruits and vegetables, decreasing fast food intake, consuming healthier snacks, limiting drinks that contain sugar, moderation in carbohydrate intake, increasing intake of lean protein, reducing intake of saturated and trans fat and reducing intake of cholesterol  Exercise recommendations include exercising 3-5 times per week  No pharmacotherapy was ordered  Rationale for BMI follow-up plan is due to patient being overweight or obese  Depression Screening and Follow-up Plan: Patient was screened for depression during today's encounter  They screened negative with a PHQ-2 score of 0

## 2022-03-09 NOTE — ASSESSMENT & PLAN NOTE
Highly recommend decreasing caloric intake increasing activity to achieve weight loss over time  Long discussion how to lose weight and at the end every day the bottom line is fewer calories in then burned over time equals  weight loss

## 2022-03-09 NOTE — ASSESSMENT & PLAN NOTE
Lab Results   Component Value Date    EGFR 59 03/07/2022    EGFR 66 11/19/2021    EGFR 64 03/04/2021    CREATININE 1 26 03/07/2022    CREATININE 1 15 11/19/2021    CREATININE 1 19 03/04/2021

## 2022-03-09 NOTE — PATIENT INSTRUCTIONS
Problem List Items Addressed This Visit        Cardiovascular and Mediastinum    Chronic atrial fibrillation ([facility] ) - Primary     Continue Xarelto  Migraine syndrome     Continue propranolol  Other    Erectile dysfunction of non-organic origin     Viagra as needed  Hyperglycemia     Fasting sugar normal at 97 A1c 5 5 continue to observe yearly  Vitamin D deficiency     Vitamin-D is good at 61 continues to supplementation  Mixed hyperlipidemia     Patient is not on any prescription medication LDL stable at 96 goal is below 100 as he is status post stroke without residual symptoms  Morbid obesity ([facility] )     Highly recommend decreasing caloric intake increasing activity to achieve weight loss over time             Other Visit Diagnoses     Screening for colon cancer        Relevant Orders    Ambulatory Referral to Gastroenterology

## 2022-03-09 NOTE — ASSESSMENT & PLAN NOTE
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

## 2022-03-09 NOTE — PROGRESS NOTES
Assessment and Plan:     Problem List Items Addressed This Visit        Cardiovascular and Mediastinum    Chronic atrial fibrillation ([facility] ) - Primary     Continue Xarelto  Relevant Orders    CBC and differential    Migraine syndrome     Continue propranolol  Nervous and Auditory    Lumbar radiculopathy     Check xray lumbar spine  Patient is complaining of a anterior right leg discomfort that wakes him up all throughout the night when he lays on his side if he lays on his back it goes away he does not have it during the day  Does not have any low back pain no loss of strength  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  Relevant Orders    XR spine lumbar minimum 4 views non injury       Genitourinary    Stage 3a chronic kidney disease ([facility] )     Lab Results   Component Value Date    EGFR 59 03/07/2022    EGFR 66 11/19/2021    EGFR 64 03/04/2021    CREATININE 1 26 03/07/2022    CREATININE 1 15 11/19/2021    CREATININE 1 19 03/04/2021               Other    Erectile dysfunction of non-organic origin     Viagra as needed  Hyperglycemia     Fasting sugar normal at 97 A1c 5 5 continue to observe yearly  Relevant Orders    Hemoglobin A1C    Comprehensive metabolic panel    Vitamin D deficiency     Vitamin-D is good at 63 continues to supplementation  Relevant Orders    Vitamin D 25 hydroxy    Mixed hyperlipidemia     Patient is not on any prescription medication LDL stable at 96 goal is below 100 as he is status post stroke without residual symptoms  Relevant Orders    Lipid Panel with Direct LDL reflex    Morbid obesity ([facility] )     Highly recommend decreasing caloric intake increasing activity to achieve weight loss over time  Long discussion how to lose weight and at the end every day the bottom line is fewer calories in then burned over time equals  weight loss           Medicare annual wellness visit, initial Pack in 5 wishes given patient encouraged to bring in living will to put in his ACP document tap  First pneumonia vaccine is due  Up-to-date with COVID and shingles and blood work  Other Visit Diagnoses     Screening for colon cancer        Relevant Orders    Ambulatory Referral to Gastroenterology    Colon cancer screening        Relevant Orders    Ambulatory Referral to Colorectal Surgery    Prostate cancer screening        Relevant Orders    PSA, Total Screen          Depression Screening and Follow-up Plan: Patient was screened for depression during today's encounter  They screened negative with a PHQ-2 score of 0  Preventive health issues were discussed with patient, and age appropriate screening tests were ordered as noted in patient's After Visit Summary  Personalized health advice and appropriate referrals for health education or preventive services given if needed, as noted in patient's After Visit Summary  History of Present Illness:     Patient presents for Welcome to Medicare visit  Patient Care Team:  Efra Santana PA-C as PCP - General (Family Medicine)  Efra Santana PA-C     Review of Systems:     Review of Systems   Constitutional: Negative  HENT: Negative  Eyes: Negative  Respiratory: Negative  Cardiovascular: Negative  Gastrointestinal: Negative  Endocrine: Negative  Genitourinary: Negative  Musculoskeletal: Negative  Leg pain     Skin: Negative  Allergic/Immunologic: Negative  Neurological: Negative  Hematological: Negative  Psychiatric/Behavioral: Negative         Problem List:     Patient Active Problem List   Diagnosis    Diverticulosis    Chronic atrial fibrillation (HCC)    Allergic rhinitis    Erectile dysfunction of non-organic origin    Gastroesophageal reflux disease without esophagitis    Hyperglycemia    Migraine syndrome    Vitamin D deficiency    Mixed hyperlipidemia    History of stroke without residual deficits    Morbid obesity ([facility] )    Left knee injury    Left abducens nerve palsy    Medicare annual wellness visit, initial    Stage 3a chronic kidney disease ([facility] )    Lumbar radiculopathy      Past Medical and Surgical History:     Past Medical History:   Diagnosis Date    Acute renal insufficiency     LAST ASSESSED: [id number]    Chronic a-fib ([facility] )     Transient cerebral ischemia 05/16/2009    Vitamin D deficiency     LAST ASSESSED: [id number]     History reviewed  No pertinent surgical history     Family History:     Family History   Problem Relation Age of Onset    Arthritis Mother     Arthritis Father       Social History:     Social History     Socioeconomic History    Marital status: /Civil Union     Spouse name: None    Number of children: None    Years of education: None    Highest education level: None   Occupational History    Occupation: EMPLOYED   Tobacco Use    Smoking status: Never Smoker    Smokeless tobacco: Never Used    Tobacco comment: NO SECONDHAND SMOKE EXPOSURE   Substance and Sexual Activity    Alcohol use: Yes     Comment: SOCIAL    Drug use: Never    Sexual activity: None   Other Topics Concern    None   Social History Narrative    None     Social Determinants of Health     Financial Resource Strain: Not on file   Food Insecurity: Not on file   Transportation Needs: Not on file   Physical Activity: Not on file   Stress: Not on file   Social Connections: Not on file   Intimate Partner Violence: Not on file   Housing Stability: Not on file      Medications and Allergies:     Current Outpatient Medications   Medication Sig Dispense Refill    Ascorbic Acid (VITAMIN C PO) Take by mouth      aspirin 81 MG tablet Take by mouth      Cholecalciferol 1000 units CHEW Chew      Coconut Oil 1000 MG CAPS Take 2 capsules by mouth daily        diphenhydrAMINE (BENADRYL) 25 mg capsule Take 25 mg by mouth every 6 (six) hours as needed      glucosamine-chondroitin 500-400 MG tablet Take 1 tablet by mouth 1500mg       ipratropium (ATROVENT) 0 06 % nasal spray       Menatetrenone (Vitamin K2) 100 MCG TABS Take by mouth      Misc Natural Products (PUMPKIN SEED OIL) CAPS Take by mouth 1400       Multiple Vitamin (MULTIVITAMIN) capsule Take by mouth      propranolol (INDERAL LA) 60 mg 24 hr capsule Take 1 capsule (60 mg total) by mouth daily 90 capsule 3    Red Yeast Rice 600 MG CAPS Take by mouth 2400mg       rivaroxaban (XARELTO) 20 mg tablet Take 20 mg by mouth      sildenafil (Viagra) 100 mg tablet Take 1 tablet (100 mg total) by mouth as needed for erectile dysfunction 30 tablet 1    Zinc 30 MG CAPS Take by mouth       No current facility-administered medications for this visit  No Known Allergies   Immunizations:     Immunization History   Administered Date(s) Administered    COVID-19 PFIZER VACCINE 0 3 ML IM 03/04/2021, 03/24/2021, 12/27/2021    DTaP 5 11/18/2013    Hep A, adult 01/15/2019    INFLUENZA 12/24/2019, 11/06/2020, 11/16/2021    Influenza Quadrivalent Preservative Free 3 years and older IM 10/27/2014, 01/04/2017, 02/21/2018    Influenza Quadrivalent, 6-35 Months IM 11/02/2015    Influenza, Seasonal Vaccine, Quadrivalent, Adjuvanted,  5e 11/16/2021    Influenza, recombinant, quadrivalent,injectable, preservative free 02/27/2019    Influenza, seasonal, injectable 12/14/2011, 11/14/2013    Tdap 10/01/2013    Typhoid, Unspecified 01/15/2019    Typhoid, ViCPs 01/15/2019    Zoster Vaccine Recombinant 07/22/2020, 12/10/2020      Health Maintenance:         Topic Date Due    Colorectal Cancer Screening  Never done    HIV Screening  Completed    Hepatitis C Screening  Completed         Topic Date Due    Pneumococcal Vaccine: 65+ Years (1 of 2 - PPSV23) Never done      Medicare Screening Tests and Risk Assessments:     Reinaldo Jefferson is here for his Welcome to Medicare visit  Health Risk Assessment:   Patient rates overall health as very good  Patient feels that their physical health rating is same  Patient is satisfied with their life  Eyesight was rated as same  Hearing was rated as same  Patient feels that their emotional and mental health rating is same  Patients states they are never, rarely angry  Patient states they are never, rarely unusually tired/fatigued  Pain experienced in the last 7 days has been some  Patient's pain rating has been 4/10  Patient states that he has experienced no weight loss or gain in last 6 months  Depression Screening:   PHQ-2 Score: 0      Fall Risk Screening: In the past year, patient has experienced: no history of falling in past year      Home Safety:  Patient does not have trouble with stairs inside or outside of their home  Patient has working smoke alarms and has working carbon monoxide detector  Home safety hazards include: none  Nutrition:   Current diet is Regular  Medications:   Patient is currently taking over-the-counter supplements  OTC medications include: see medication list  Patient is able to manage medications  Activities of Daily Living (ADLs)/Instrumental Activities of Daily Living (IADLs):   Walk and transfer into and out of bed and chair?: Yes  Dress and groom yourself?: Yes    Bathe or shower yourself?: Yes    Feed yourself?  Yes  Do your laundry/housekeeping?: Yes  Manage your money, pay your bills and track your expenses?: Yes  Make your own meals?: Yes    Do your own shopping?: Yes    Previous Hospitalizations:   Any hospitalizations or ED visits within the last 12 months?: No      Advance Care Planning:     Five wishes given: Yes      Cognitive Screening:   Provider or family/friend/caregiver concerned regarding cognition?: No    PREVENTIVE SCREENINGS      Cardiovascular Screening:    General: Screening Not Indicated, History Lipid Disorder and Screening Current      Diabetes Screening:     General: Screening Current      Prostate Cancer Screening:    General: Screening Current      Osteoporosis Screening:    General: Screening Not Indicated      Abdominal Aortic Aneurysm (AAA) Screening:    Risk factors include: age between 73-69 yo        General: Screening Not Indicated      Lung Cancer Screening:     General: Screening Not Indicated      Hepatitis C Screening:    General: Screening Current    Screening, Brief Intervention, and Referral to Treatment (SBIRT)    Screening  Typical number of drinks in a day: 0  Typical number of drinks in a week: 1  Interpretation: Low risk drinking behavior  AUDIT-C Screenin) How often did you have a drink containing alcohol in the past year? monthly or less  2) How many drinks did you have on a typical day when you were drinking in the past year? 1 to 2  3) How often did you have 6 or more drinks on one occasion in the past year? never    AUDIT-C Score: 1  Interpretation: Score 0-3 (male): Negative screen for alcohol misuse    Single Item Drug Screening:  How often have you used an illegal drug (including marijuana) or a prescription medication for non-medical reasons in the past year? never    Single Item Drug Screen Score: 0  Interpretation: Negative screen for possible drug use disorder     Visual Acuity Screening    Right eye Left eye Both eyes   Without correction:      With correction: 20/20 20/20 20/20        Physical Exam:     /60 (BP Location: Right arm, Patient Position: Sitting, Cuff Size: Large)   Pulse 60   Temp (!) 97 1 °F (36 2 °C) (Temporal)   Ht 6' (1 829 m) Comment: on record  Wt (!) 138 kg (303 lb 9 6 oz)   BMI 41 18 kg/m²     Physical Exam  Vitals and nursing note reviewed  Constitutional:       General: He is not in acute distress  Appearance: Normal appearance  He is well-developed  He is not ill-appearing  HENT:      Head: Normocephalic and atraumatic        Right Ear: Hearing, tympanic membrane, ear canal and external ear normal       Left Ear: Hearing, tympanic membrane, ear canal and external ear normal       Nose: Nose normal       Mouth/Throat:      Mouth: Mucous membranes are moist       Pharynx: Oropharynx is clear  Eyes:      General: Lids are normal          Right eye: No discharge  Left eye: No discharge  Extraocular Movements: Extraocular movements intact  Conjunctiva/sclera: Conjunctivae normal       Pupils: Pupils are equal, round, and reactive to light  Cardiovascular:      Rate and Rhythm: Normal rate and regular rhythm  Heart sounds: Normal heart sounds  Pulmonary:      Effort: Pulmonary effort is normal  No respiratory distress  Breath sounds: Normal breath sounds  Abdominal:      General: Bowel sounds are normal       Palpations: Abdomen is soft  Tenderness: There is no abdominal tenderness  Musculoskeletal:      Cervical back: Neck supple  Lymphadenopathy:      Cervical: No cervical adenopathy  Skin:     General: Skin is warm and dry  Neurological:      General: No focal deficit present  Mental Status: He is alert  Motor: Motor function is intact  Coordination: Coordination is intact  Deep Tendon Reflexes: Reflexes are normal and symmetric  Psychiatric:         Mood and Affect: Mood normal          Behavior: Behavior normal  Behavior is cooperative  Thought Content:  Thought content normal          Judgment: Judgment normal           Betty Jewell PA-C

## 2022-03-09 NOTE — ASSESSMENT & PLAN NOTE
Pack in 5 wishes given patient encouraged to bring in living will to put in his ACP document tap  First pneumonia vaccine is due  Up-to-date with COVID and shingles and blood work

## 2022-04-22 ENCOUNTER — TELEPHONE (OUTPATIENT)
Dept: GASTROENTEROLOGY | Facility: MEDICAL CENTER | Age: 67
End: 2022-04-22

## 2022-04-22 NOTE — TELEPHONE ENCOUNTER
Spoke to patient regarding colon screening options, patient states he will call when he is ready to schedule

## 2022-05-09 DIAGNOSIS — G43.909 MIGRAINE SYNDROME: ICD-10-CM

## 2022-05-09 RX ORDER — PROPRANOLOL HCL 60 MG
CAPSULE, EXTENDED RELEASE 24HR ORAL
Qty: 90 CAPSULE | Refills: 3 | Status: SHIPPED | OUTPATIENT
Start: 2022-05-09

## 2022-10-04 DIAGNOSIS — G43.909 MIGRAINE SYNDROME: ICD-10-CM

## 2022-10-04 RX ORDER — PROPRANOLOL HCL 60 MG
60 CAPSULE, EXTENDED RELEASE 24HR ORAL DAILY
Qty: 90 CAPSULE | Refills: 1 | Status: SHIPPED | OUTPATIENT
Start: 2022-10-04

## 2022-10-04 RX ORDER — PROPRANOLOL HCL 60 MG
60 CAPSULE, EXTENDED RELEASE 24HR ORAL DAILY
Qty: 90 CAPSULE | Refills: 3 | Status: SHIPPED | OUTPATIENT
Start: 2022-10-04 | End: 2022-10-04 | Stop reason: SDUPTHER

## 2022-10-04 NOTE — TELEPHONE ENCOUNTER
Requested Prescriptions     Pending Prescriptions Disp Refills    propranolol (INDERAL LA) 60 mg 24 hr capsule 90 capsule 3     Sig: Take 1 capsule (60 mg total) by mouth daily     LOV 3/9/22, F/U 3/15/23, labs active

## 2022-10-06 NOTE — TELEPHONE ENCOUNTER
We had a signed script in nursing for this  I called patient to find out if he wanted to pick it up or did he want it sent to [facility]  He only wanted it sent to [facility]  It was sent 10/4

## 2022-10-11 PROBLEM — Z00.00 MEDICARE ANNUAL WELLNESS VISIT, INITIAL: Status: RESOLVED | Noted: 2022-03-09 | Resolved: 2022-10-11

## 2022-11-18 ENCOUNTER — APPOINTMENT (EMERGENCY)
Dept: NON INVASIVE DIAGNOSTICS | Facility: HOSPITAL | Age: 67
End: 2022-11-18

## 2022-11-18 ENCOUNTER — OFFICE VISIT (OUTPATIENT)
Dept: URGENT CARE | Facility: MEDICAL CENTER | Age: 67
End: 2022-11-18

## 2022-11-18 ENCOUNTER — APPOINTMENT (OUTPATIENT)
Dept: RADIOLOGY | Facility: MEDICAL CENTER | Age: 67
End: 2022-11-18

## 2022-11-18 ENCOUNTER — HOSPITAL ENCOUNTER (EMERGENCY)
Facility: HOSPITAL | Age: 67
Discharge: HOME | End: 2022-11-18
Attending: EMERGENCY MEDICINE

## 2022-11-18 VITALS
DIASTOLIC BLOOD PRESSURE: 78 MMHG | SYSTOLIC BLOOD PRESSURE: 151 MMHG | OXYGEN SATURATION: 98 % | BODY MASS INDEX: 43.06 KG/M2 | HEART RATE: 72 BPM | WEIGHT: 315 LBS | TEMPERATURE: 98.3 F | RESPIRATION RATE: 18 BRPM

## 2022-11-18 VITALS
DIASTOLIC BLOOD PRESSURE: 84 MMHG | TEMPERATURE: 96.9 F | RESPIRATION RATE: 18 BRPM | HEART RATE: 73 BPM | SYSTOLIC BLOOD PRESSURE: 139 MMHG | OXYGEN SATURATION: 98 %

## 2022-11-18 DIAGNOSIS — M79.605 PAIN OF LEFT LOWER EXTREMITY DUE TO INJURY: ICD-10-CM

## 2022-11-18 DIAGNOSIS — M79.604 RIGHT LEG PAIN: Primary | ICD-10-CM

## 2022-11-18 DIAGNOSIS — M79.604 RIGHT LEG PAIN: ICD-10-CM

## 2022-11-18 DIAGNOSIS — S80.12XA HEMATOMA OF LEFT LOWER EXTREMITY, INITIAL ENCOUNTER: ICD-10-CM

## 2022-11-18 DIAGNOSIS — L03.116 CELLULITIS OF LEFT ANTERIOR LOWER LEG: Primary | ICD-10-CM

## 2022-11-18 LAB
BASOPHILS # BLD AUTO: 0.05 THOUSANDS/ÂΜL (ref 0–0.1)
BASOPHILS NFR BLD AUTO: 1 % (ref 0–1)
EOSINOPHIL # BLD AUTO: 0.18 THOUSAND/ÂΜL (ref 0–0.61)
EOSINOPHIL NFR BLD AUTO: 2 % (ref 0–6)
ERYTHROCYTE [DISTWIDTH] IN BLOOD BY AUTOMATED COUNT: 12.9 % (ref 11.6–15.1)
HCT VFR BLD AUTO: 43.1 % (ref 36.5–49.3)
HGB BLD-MCNC: 14.3 G/DL (ref 12–17)
IMM GRANULOCYTES # BLD AUTO: 0.04 THOUSAND/UL (ref 0–0.2)
IMM GRANULOCYTES NFR BLD AUTO: 1 % (ref 0–2)
INR PPP: 1.16 (ref 0.84–1.19)
LYMPHOCYTES # BLD AUTO: 1.67 THOUSANDS/ÂΜL (ref 0.6–4.47)
LYMPHOCYTES NFR BLD AUTO: 19 % (ref 14–44)
MCH RBC QN AUTO: 32.1 PG (ref 26.8–34.3)
MCHC RBC AUTO-ENTMCNC: 33.2 G/DL (ref 31.4–37.4)
MCV RBC AUTO: 97 FL (ref 82–98)
MONOCYTES # BLD AUTO: 0.87 THOUSAND/ÂΜL (ref 0.17–1.22)
MONOCYTES NFR BLD AUTO: 10 % (ref 4–12)
NEUTROPHILS # BLD AUTO: 5.86 THOUSANDS/ÂΜL (ref 1.85–7.62)
NEUTS SEG NFR BLD AUTO: 67 % (ref 43–75)
NRBC BLD AUTO-RTO: 0 /100 WBCS
PLATELET # BLD AUTO: 196 THOUSANDS/UL (ref 149–390)
PMV BLD AUTO: 9.5 FL (ref 8.9–12.7)
PROTHROMBIN TIME: 14.8 SECONDS (ref 11.6–14.5)
RBC # BLD AUTO: 4.45 MILLION/UL (ref 3.88–5.62)
WBC # BLD AUTO: 8.67 THOUSAND/UL (ref 4.31–10.16)

## 2022-11-18 RX ORDER — TRAMADOL HYDROCHLORIDE 50 MG/1
50 TABLET ORAL EVERY 6 HOURS PRN
Qty: 15 TABLET | Refills: 0 | Status: SHIPPED | OUTPATIENT
Start: 2022-11-18 | End: 2022-11-28

## 2022-11-18 RX ORDER — CEPHALEXIN 500 MG/1
500 CAPSULE ORAL EVERY 6 HOURS SCHEDULED
Qty: 28 CAPSULE | Refills: 0 | Status: SHIPPED | OUTPATIENT
Start: 2022-11-18 | End: 2022-11-24 | Stop reason: ALTCHOICE
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
## 2022-11-18 NOTE — Clinical Note
Karen Cutler was seen and treated in our emergency department on 11/18/2022  Diagnosis:     Jj Salter  may return to work on return date  He may return on this date: 11/21/2022         If you have any questions or concerns, please don't hesitate to call        Castro Villagomez MD    ______________________________           _______________          _______________  Hospital Representative                              Date                                Time

## 2022-11-18 NOTE — PATIENT INSTRUCTIONS
Xrays show no fracture  Concern over swelling and warmth of left lower extremity warrants additional evaluation and diagnostic testing at ER  Transfer referral placed  Pt states he will report to [facility] now
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
## 2022-11-24 ENCOUNTER — AMB VIDEO VISIT (OUTPATIENT)
Dept: OTHER | Facility: HOSPITAL | Age: 67
End: 2022-11-24

## 2022-11-24 DIAGNOSIS — L03.116 CELLULITIS OF LEFT ANTERIOR LOWER LEG: Primary | ICD-10-CM

## 2022-11-24 RX ORDER — CEFUROXIME AXETIL 500 MG/1
500 TABLET ORAL EVERY 12 HOURS SCHEDULED
Qty: 20 TABLET | Refills: 0 | Status: SHIPPED | OUTPATIENT
Start: 2022-11-24 | End: 2022-12-04

## 2022-11-24 NOTE — PROGRESS NOTES
Video Visit - Jodi Foote 78 y o  male MRN: [id number]    REQUIRED DOCUMENTATION:         1  This service was provided via [facility]  2  Provider located at [address] [place] [id number] [phone]  3  [facility] provider: Pura Rodríguez PA-C   4  Identify all parties in room with patient during [facility] visit:  No one else  5  After connecting through [employer], patient was identified by name and date of birth  Patient was then informed that this was a Telemedicine visit and that the exam was being conducted confidentially over secure lines  My office door was closed  No one else was in the room  Patient acknowledged consent and understanding of privacy and security of the Telemedicine visit  I informed the patient that I have reviewed their record in Epic and presented the opportunity for them to ask any questions regarding the visit today  The patient agreed to participate  VITALS: Heart Rate: 62 BPM, Respiratory Rate: 13 RPM, Temperature Unavailable° F, Blood Pressure Unavailable mmHg, Pulse Ox Unavailable % on RA    HPI  Cellulitis on L shin about to run out of the keflex 500 QID tomorrow, but reports it is still very warm to touch and painful especially when he first stands and walks  Denies fevers  No spreading redness  Hasn't noticed worsening, but hasn't noticed improvement either  Physical Exam  Constitutional:       General: He is not in acute distress  Appearance: Normal appearance  He is obese  He is not ill-appearing or toxic-appearing  HENT:      Head: Normocephalic and atraumatic  Nose: No rhinorrhea  Mouth/Throat:      Mouth: Mucous membranes are moist    Eyes:      Conjunctiva/sclera: Conjunctivae normal       Comments: glasses   Cardiovascular:      Rate and Rhythm: Normal rate  Pulmonary:      Effort: Pulmonary effort is normal  No respiratory distress  Breath sounds: No wheezing (no gross audible wheeze through computer)  Musculoskeletal:      Cervical back: Normal range of motion  Skin:     Findings: Bruising (multiple areas to LLE) and erythema (L anterior shin approximately 15-20 cm in diameter) present  No rash (on face or neck)  Neurological:      Mental Status: He is alert  Cranial Nerves: No dysarthria or facial asymmetry  Psychiatric:         Mood and Affect: Mood normal          Behavior: Behavior normal        Since patient hasn't had improvement on keflex, but is tolerating well will switch to ceftin to get some gram neg coverage as well  Recommend f/u with PCP next week and discussed ER precautions  Diagnoses and all orders for this visit:    Cellulitis of left anterior lower leg  -     cefuroxime (CEFTIN) 500 mg tablet; Take 1 tablet (500 mg total) by mouth every 12 (twelve) hours for 10 days      Patient Instructions   After 2 full days of being on the antibiotic, outline the area of redness with a permanent marker  If the redness spreads beyond the marked area, you develop fevers/chills, worsening pain, or streaking redness, you should go to the emergency department for evaluation  Please schedule a follow-up with your primary care provider as soon as possible

## 2022-11-24 NOTE — PATIENT INSTRUCTIONS
After 2 full days of being on the antibiotic, outline the area of redness with a permanent marker  If the redness spreads beyond the marked area, you develop fevers/chills, worsening pain, or streaking redness, you should go to the emergency department for evaluation  Please schedule a follow-up with your primary care provider as soon as possible

## 2022-11-24 NOTE — CARE ANYWHERE EVISITS
Visit Summary for [facility] - Gender: Male - Date of Birth: [id number]  Date: [id number] - Duration: 14 minutes  Patient: [facility]  Provider: Cruzito Fontenot PA-C    Patient Contact Information  Address  [address] [place] [address]  [place]; [address]  [phone]    Visit Topics    Triage Questions   What is your current physical address in the event of a medical emergency? Answer []  Are you allergic to any medications? Answer []  Are you now or could you be pregnant? Answer []  Do you have any immune system compromise or chronic lung   disease? Answer []  Do you have any vulnerable family members in the home (infant, pregnant, cancer, elderly)? Answer []     Conversation Transcripts  [0A][0A] [Notification] You are connected with Cruzito Fontenot PA-C, Urgent Care Specialist [0A][Notification] Dashawn Galarza is located in [place]  [0A][Notification] Dashawn Galarza has shared health history  Kathleen Weiss  [0A]    Diagnosis  Cellulitis of left lower limb    Procedures  Value: [id number] Code: CPT-4 UNLISTED E&M SERVICE    Medications Prescribed    No prescriptions ordered    Electronically signed by: Alexandra Ruiz(NPI [id number])

## 2022-12-06 ENCOUNTER — OFFICE VISIT (OUTPATIENT)
Dept: FAMILY MEDICINE CLINIC | Facility: CLINIC | Age: 67
End: 2022-12-06

## 2022-12-06 VITALS
SYSTOLIC BLOOD PRESSURE: 112 MMHG | BODY MASS INDEX: 41.85 KG/M2 | HEART RATE: 76 BPM | HEIGHT: 72 IN | OXYGEN SATURATION: 97 % | WEIGHT: 309 LBS | DIASTOLIC BLOOD PRESSURE: 68 MMHG

## 2022-12-06 DIAGNOSIS — L98.9 SKIN LESION OF FACE: ICD-10-CM

## 2022-12-06 DIAGNOSIS — L03.116 CELLULITIS OF LEFT LOWER EXTREMITY: ICD-10-CM

## 2022-12-06 DIAGNOSIS — Z12.11 SCREENING FOR COLON CANCER: Primary | ICD-10-CM

## 2022-12-06 RX ORDER — SULFAMETHOXAZOLE AND TRIMETHOPRIM 800; 160 MG/1; MG/1
1 TABLET ORAL EVERY 12 HOURS SCHEDULED
Qty: 14 TABLET | Refills: 0 | Status: SHIPPED | OUTPATIENT
Start: 2022-12-06 | End: 2022-12-13
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
## 2023-01-24 ENCOUNTER — CONSULT (OUTPATIENT)
Dept: DERMATOLOGY | Facility: CLINIC | Age: 68
End: 2023-01-24

## 2023-01-24 VITALS — WEIGHT: 303 LBS | HEIGHT: 72 IN | TEMPERATURE: 96.6 F | BODY MASS INDEX: 41.04 KG/M2

## 2023-01-24 DIAGNOSIS — L98.9 SKIN LESION OF FACE: ICD-10-CM

## 2023-01-24 DIAGNOSIS — D48.5 NEOPLASM OF UNCERTAIN BEHAVIOR OF SKIN: Primary | ICD-10-CM

## 2023-01-24 NOTE — PATIENT INSTRUCTIONS
NEOPLASM OF UNCERTAIN BEHAVIOR OF SKIN    Assessment and Plan:  I have discussed with the patient that a sample of skin via a "skin biopsy” would be potentially helpful to further make a specific diagnosis under the microscope  Based on a thorough discussion of this condition and the management approach to it (including a comprehensive discussion of the known risks, side effects and potential benefits of treatment), the patient (family) agrees to implement the following specific plan:    Procedure:  Skin Biopsy  After a thorough discussion of treatment options and risk/benefits/alternatives (including but not limited to local pain, scarring, dyspigmentation, blistering, possible superinfection, and inability to confirm a diagnosis via histopathology), verbal and written consent were obtained and portion of the rash was biopsied for tissue sample  See below for consent that was obtained from patient and subsequent Procedure Note  INFORMED CONSENT DISCUSSION AND POST-OPERATIVE INSTRUCTIONS FOR PATIENT    I   RATIONALE FOR PROCEDURE  I understand that a skin biopsy allows the Dermatologist to test a lesion or rash under the microscope to obtain a diagnosis  It usually involves numbing the area with numbing medication and removing a small piece of skin; sometimes the area will be closed with sutures  In this specific procedure, sutures are not usually needed  If any sutures are placed, then they are usually need to be removed in 2 weeks or less  I understand that my Dermatologist recommends that a skin "shave" biopsy be performed today  A local anesthetic, similar to the kind that a dentist uses when filling a cavity, will be injected with a very small needle into the skin area to be sampled  The injected skin and tissue underneath "will go to sleep” and become numb so no pain should be felt afterwards    An instrument shaped like a tiny "razor blade" (shave biopsy instrument) will be used to cut a small piece of tissue and skin from the area so that a sample of tissue can be taken and examined more closely under the microscope  A slight amount of bleeding will occur, but it will be stopped with direct pressure and a pressure bandage and any other appropriate methods  I understands that a scar will form where the wound was created  Surgical ointment will be applied to help protect the wound  Sutures are not usually needed  II   RISKS AND POTENTIAL COMPLICATIONS   I understand the risks and potential complications of a skin biopsy include but are not limited to the following:  Bleeding  Infection  Pain  Scar/keloid  Skin discoloration  Incomplete Removal  Recurrence  Nerve Damage/Numbness/Loss of Function  Allergic Reaction to Anesthesia  Biopsies are diagnostic procedures and based on findings additional treatment or evaluation may be required  Loss or destruction of specimen resulting in no additional findings    My Dermatologist has explained to me the nature of the condition, the nature of the procedure, and the benefits to be reasonably expected compared with alternative approaches  My Dermatologist has discussed the likelihood of major risks or complications of this procedure including the specific risks listed above, such as bleeding, infection, and scarring/keloid  I understand that a scar is expected after this procedure  I understand that my physician cannot predict if the scar will form a "keloid," which extends beyond the borders of the wound that is created  A keloid is a thick, painful, and bumpy scar  A keloid can be difficult to treat, as it does not always respond well to therapy, which includes injecting cortisone directly into the keloid every few weeks  While this usually reduces the pain and size of the scar, it does not eliminate it  I understand that photographs may be taken before and after the procedure    These will be maintained as part of the medical providers confidential records and may not be made available to me  I further authorize the medical provider to use the photographs for teaching purposes or to illustrate scientific papers, books, or lectures if in his/her judgment, medical research, education, or science may benefit from its use  I have had an opportunity to fully inquire about the risks and benefits of this procedure and its alternatives  I have been given ample time and opportunity to ask questions and to seek a second opinion if I wished to do so  I acknowledge that there have specifically been no guarantees as to the cosmetic results from the procedure  I am aware that with any procedure there is always the possibility of an unexpected complication  III  POST-PROCEDURAL CARE (WHAT YOU WILL NEED TO DO "AFTER THE BIOPSY" TO OPTIMIZE HEALING)    Keep the area clean and dry  Try NOT to remove the bandage or get it wet for the first 24 hours  Gently clean the area and apply surgical ointment (such as Vaseline petrolatum ointment, which is available "over the counter" and not a prescription) to the biopsy site for up to 2 weeks straight  This acts to protect the wound from the outside world  Sutures are not usually placed in this procedure  If any sutures were placed, return for suture removal as instructed (generally 1 week for the face, 2 weeks for the body)  Take Acetaminophen (Tylenol) for discomfort, if no contraindications  Ibuprofen or aspirin could make bleeding worse  Call our office immediately for signs of infection: fever, chills, increased redness, warmth, tenderness, discomfort/pain, or pus or foul smell coming from the wound  WHAT TO DO IF THERE IS ANY BLEEDING? If a small amount of bleeding is noticed, place a clean cloth over the area and apply firm pressure for ten minutes  Check the wound after 10 minutes of direct pressure    If bleeding persists, try one more time for an additional 10 minutes of direct pressure on the area   If the bleeding becomes heavier or does not stop after the second attempt, or if you have any other questions about this procedure, then please call your [facility] by calling [phone] (SKIN)  I hereby acknowledge that I have reviewed and verified the site with my Dermatologist and have requested and authorized my Dermatologist to proceed with the procedure

## 2023-01-24 NOTE — PROGRESS NOTES
[facility] Dermatology Clinic Note     Patient Name: Remberto Bueno  Encounter Date: 01/24/23    Have you been cared for by a [facility] Dermatologist in the last 3 years and, if so, which description applies to you? NO  I am considered a "new" patient and must complete all patient intake questions  I am MALE/not capable of bearing children  REVIEW OF SYSTEMS:  Have you recently had or currently have any of the following? · Recent fever or chills? No  · Any non-healing wound? No   PAST MEDICAL HISTORY:  Have you personally ever had or currently have any of the following? If "YES," then please provide more detail  · Skin cancer (such as Melanoma, Basal Cell Carcinoma, Squamous Cell Carcinoma? No  · Tuberculosis, HIV/AIDS, Hepatitis B or C: No  · Systemic Immunosuppression such as Diabetes, Biologic or Immunotherapy, Chemotherapy, Organ Transplantation, Bone Marrow Transplantation No  · Radiation Treatment No   FAMILY HISTORY:  Any "first degree relatives" (parent, brother, sister, or child) with the following? • Skin Cancer, Pancreatic or Other Cancer? No   PATIENT EXPERIENCE:    • Do you want the Dermatologist to perform a COMPLETE skin exam today including a clinical examination under the "bra and underwear" areas? NO  • If necessary, do we have your permission to call and leave a detailed message on your Preferred Phone number that includes your specific medical information?   Yes      No Known Allergies   Current Outpatient Medications:   •  Ascorbic Acid (VITAMIN C PO), Take by mouth, Disp: , Rfl:   •  aspirin 81 MG tablet, Take by mouth, Disp: , Rfl:   •  Coconut Oil 1000 MG CAPS, Take 2 capsules by mouth daily  , Disp: , Rfl:   •  diphenhydrAMINE (BENADRYL) 25 mg capsule, Take 25 mg by mouth every 6 (six) hours as needed, Disp: , Rfl:   •  glucosamine-chondroitin 500-400 MG tablet, Take 1 tablet by mouth 1500mg , Disp: , Rfl:   •  Menatetrenone (Vitamin K2) 100 MCG TABS, Take by mouth, Disp: , Rfl:   •  Misc Natural Products (PUMPKIN SEED OIL) CAPS, Take by mouth 1400 , Disp: , Rfl:   •  Multiple Vitamin (MULTIVITAMIN) capsule, Take by mouth, Disp: , Rfl:   •  propranolol (INDERAL LA) 60 mg 24 hr capsule, Take 1 capsule (60 mg total) by mouth daily, Disp: 90 capsule, Rfl: 1  •  Red Yeast Rice 600 MG CAPS, Take by mouth 2400mg , Disp: , Rfl:   •  Zinc 30 MG CAPS, Take by mouth, Disp: , Rfl:   •  rivaroxaban (XARELTO) 20 mg tablet, Take 20 mg by mouth daily with dinner, Disp: , Rfl:           • Whom besides the patient is providing clinical information about today's encounter?   o NO ADDITIONAL HISTORIAN (patient alone provided history)    Physical Exam and Assessment/Plan by Diagnosis:    NEOPLASM OF UNCERTAIN BEHAVIOR OF SKIN    Physical Exam:  • (Anatomic Location); (Size and Morphological Description); (Differential Diagnosis):  o Chin: 0 4 cm verrucous papule  • Pertinent Positives:  • Pertinent Negatives: Additional History of Present Condition:  Patient noted that small on chin in last 6 months, had once before but came back  Completely asymptomatic but cosmetically bothersome and the patient said it needs to go  Assessment and Plan:  • I have discussed with the patient that a sample of skin via a "skin biopsy” would be potentially helpful to further make a specific diagnosis under the microscope  • Based on a thorough discussion of this condition and the management approach to it (including a comprehensive discussion of the known risks, side effects and potential benefits of treatment), the patient (family) agrees to implement the following specific plan:    o Procedure:  Skin Biopsy    After a thorough discussion of treatment options and risk/benefits/alternatives (including but not limited to local pain, scarring, dyspigmentation, blistering, possible superinfection, and inability to confirm a diagnosis via histopathology), verbal and written consent were obtained and portion of the rash was biopsied for tissue sample  See below for consent that was obtained from patient and subsequent Procedure Note  PROCEDURE TANGENTIAL (SHAVE) EXCISION NOTE:    • Performing Physician: Dr Maldonado  • Anatomic Location; Clinical Description with size (cm); Pre-Op Diagnosis:   ATTENTION:  DERMPATH GROUP  INDICATION: Cosmetic  SPECIMEN A; Skin; Anatomic Location: chin; Procedure/Protocol: Shave excision  78y o  year old  Male with a Morphological Description:0 4 cm verrucous papule    • Post-op diagnosis: Same     • Local anesthesia: 1% xylocaine with epi      • Topical anesthesia: None    • Hemostasis: Aluminum chloride       After obtaining informed consent, the area was prepped and draped in the usual fashion  Anesthesia was obtained with 1% lidocaine with epinephrine  A shave excision to an appropriate sampling depth was obtained by Shave (Dermablade or 15 blade) The resulting wound was covered with surgical ointment and bandaged appropriately  The patient tolerated the procedure well without complications and was without signs of functional compromise  Specimen has been sent for review by Dermatopathology  Standard post-procedure care has been explained and has been included in written form within the patient's copy of Informed Consent  INFORMED CONSENT DISCUSSION AND POST-OPERATIVE INSTRUCTIONS FOR PATIENT    I   RATIONALE FOR PROCEDURE  It usually involves numbing the area with numbing medication and removing a small piece of skin; sometimes the area will be closed with sutures  In this specific procedure, sutures are not usually needed  If any sutures are placed, then they are usually need to be removed in 2 weeks or less  A local anesthetic, similar to the kind that a dentist uses when filling a cavity, will be injected with a very small needle into the skin area to be sampled    The injected skin and tissue underneath "will go to sleep” and become numb so no pain should be felt afterwards  An instrument shaped like a tiny "razor blade" (shave excision instrument) will be used  A slight amount of bleeding will occur, but it will be stopped with direct pressure and a pressure bandage and any other appropriate methods  I understands that a scar will form where the wound was created  Surgical ointment will be applied to help protect the wound  Sutures are not usually needed  II   RISKS AND POTENTIAL COMPLICATIONS   I understand the risks and potential complications of a skin shave excision include but are not limited to the following:  • Bleeding  • Infection  • Pain  • Scar/keloid  • Skin discoloration  • Incomplete Removal  • Recurrence  • Nerve Damage/Numbness/Loss of Function  • Allergic Reaction to Anesthesia  • Loss or destruction of specimen resulting in no additional findings    My Dermatologist has explained to me the nature of the condition, the nature of the procedure, and the benefits to be reasonably expected compared with alternative approaches  My Dermatologist has discussed the likelihood of major risks or complications of this procedure including the specific risks listed above, such as bleeding, infection, and scarring/keloid  I understand that a scar is expected after this procedure  I understand that my physician cannot predict if the scar will form a "keloid," which extends beyond the borders of the wound that is created  A keloid is a thick, painful, and bumpy scar  A keloid can be difficult to treat, as it does not always respond well to therapy, which includes injecting cortisone directly into the keloid every few weeks  While this usually reduces the pain and size of the scar, it does not eliminate it  I understand that photographs may be taken before and after the procedure  These will be maintained as part of the medical providers confidential records and may not be made available to me    I further authorize the medical provider to use the photographs for teaching purposes or to illustrate scientific papers, books, or lectures if in his/her judgment, medical research, education, or science may benefit from its use  I have had an opportunity to fully inquire about the risks and benefits of this procedure and its alternatives  I have been given ample time and opportunity to ask questions and to seek a second opinion if I wished to do so  I acknowledge that there have specifically been no guarantees as to the cosmetic results from the procedure  I am aware that with any procedure there is always the possibility of an unexpected complication  III  POST-PROCEDURAL CARE (WHAT YOU WILL NEED TO DO "AFTER THE SHAVE EXCISION" TO OPTIMIZE HEALING)    • Keep the area clean and dry  Try NOT to remove the bandage or get it wet for the first 24 hours  • Gently clean the area and apply surgical ointment (such as Vaseline petrolatum ointment, which is available "over the counter" and not a prescription) to the biopsy site for up to 2 weeks straight  This acts to protect the wound from the outside world  • Sutures are not usually placed in this procedure  If any sutures were placed, return for suture removal as instructed (generally 1 week for the face, 2 weeks for the body)  • Take Acetaminophen (Tylenol) for discomfort, if no contraindications  Ibuprofen or aspirin could make bleeding worse  • Call our office immediately for signs of infection: fever, chills, increased redness, warmth, tenderness, discomfort/pain, or pus or foul smell coming from the wound  WHAT TO DO IF THERE IS ANY BLEEDING? If a small amount of bleeding is noticed, place a clean cloth over the area and apply firm pressure for ten minutes  Check the wound after 10 minutes of direct pressure  If bleeding persists, try one more time for an additional 10 minutes of direct pressure on the area    If the bleeding becomes heavier or does not stop after the second attempt, or if you have any other questions about this procedure, then please call your [facility] [address]'s Dermatologist by calling [phone] (SKIN)  I hereby acknowledge that I have reviewed and verified the site with my Dermatologist and have requested and authorized my Dermatologist to proceed with the procedure              Scribe Attestation    I,:  Areli Ann am acting as a scribe while in the presence of the attending physician :       I,:  Karen Bai MD personally performed the services described in this documentation    as scribed in my presence :

## 2023-03-13 ENCOUNTER — APPOINTMENT (OUTPATIENT)
Dept: LAB | Facility: CLINIC | Age: 68
End: 2023-03-13

## 2023-03-13 DIAGNOSIS — Z12.5 PROSTATE CANCER SCREENING: ICD-10-CM

## 2023-03-13 DIAGNOSIS — E55.9 VITAMIN D DEFICIENCY: ICD-10-CM

## 2023-03-13 DIAGNOSIS — I48.20 CHRONIC ATRIAL FIBRILLATION (HCC): ICD-10-CM

## 2023-03-13 DIAGNOSIS — R73.9 HYPERGLYCEMIA: ICD-10-CM

## 2023-03-13 DIAGNOSIS — E78.2 MIXED HYPERLIPIDEMIA: ICD-10-CM

## 2023-03-13 LAB
25(OH)D3 SERPL-MCNC: 65.1 NG/ML (ref 30–100)
ALBUMIN SERPL BCP-MCNC: 3.6 G/DL (ref 3.5–5)
ALP SERPL-CCNC: 75 U/L (ref 46–116)
ALT SERPL W P-5'-P-CCNC: 47 U/L (ref 12–78)
ANION GAP SERPL CALCULATED.3IONS-SCNC: 2 MMOL/L (ref 4–13)
AST SERPL W P-5'-P-CCNC: 34 U/L (ref 5–45)
BASOPHILS # BLD AUTO: 0.05 THOUSANDS/ÂΜL (ref 0–0.1)
BASOPHILS NFR BLD AUTO: 1 % (ref 0–1)
BILIRUB SERPL-MCNC: 0.67 MG/DL (ref 0.2–1)
BUN SERPL-MCNC: 23 MG/DL (ref 5–25)
CALCIUM SERPL-MCNC: 9.3 MG/DL (ref 8.3–10.1)
CHLORIDE SERPL-SCNC: 104 MMOL/L (ref 96–108)
CHOLEST SERPL-MCNC: 172 MG/DL
CO2 SERPL-SCNC: 30 MMOL/L (ref 21–32)
CREAT SERPL-MCNC: 1.14 MG/DL (ref 0.6–1.3)
EOSINOPHIL # BLD AUTO: 0.11 THOUSAND/ÂΜL (ref 0–0.61)
EOSINOPHIL NFR BLD AUTO: 1 % (ref 0–6)
ERYTHROCYTE [DISTWIDTH] IN BLOOD BY AUTOMATED COUNT: 12.6 % (ref 11.6–15.1)
EST. AVERAGE GLUCOSE BLD GHB EST-MCNC: 114 MG/DL
GFR SERPL CREATININE-BSD FRML MDRD: 66 ML/MIN/1.73SQ M
GLUCOSE P FAST SERPL-MCNC: 96 MG/DL (ref 65–99)
HBA1C MFR BLD: 5.6 %
HCT VFR BLD AUTO: 49.6 % (ref 36.5–49.3)
HDLC SERPL-MCNC: 49 MG/DL
HGB BLD-MCNC: 16.5 G/DL (ref 12–17)
IMM GRANULOCYTES # BLD AUTO: 0.03 THOUSAND/UL (ref 0–0.2)
IMM GRANULOCYTES NFR BLD AUTO: 0 % (ref 0–2)
LDLC SERPL CALC-MCNC: 98 MG/DL (ref 0–100)
LYMPHOCYTES # BLD AUTO: 1.38 THOUSANDS/ÂΜL (ref 0.6–4.47)
LYMPHOCYTES NFR BLD AUTO: 18 % (ref 14–44)
MCH RBC QN AUTO: 31.7 PG (ref 26.8–34.3)
MCHC RBC AUTO-ENTMCNC: 33.3 G/DL (ref 31.4–37.4)
MCV RBC AUTO: 95 FL (ref 82–98)
MONOCYTES # BLD AUTO: 0.62 THOUSAND/ÂΜL (ref 0.17–1.22)
MONOCYTES NFR BLD AUTO: 8 % (ref 4–12)
NEUTROPHILS # BLD AUTO: 5.54 THOUSANDS/ÂΜL (ref 1.85–7.62)
NEUTS SEG NFR BLD AUTO: 72 % (ref 43–75)
NRBC BLD AUTO-RTO: 0 /100 WBCS
PLATELET # BLD AUTO: 258 THOUSANDS/UL (ref 149–390)
PMV BLD AUTO: 10.4 FL (ref 8.9–12.7)
POTASSIUM SERPL-SCNC: 4.3 MMOL/L (ref 3.5–5.3)
PROT SERPL-MCNC: 7.9 G/DL (ref 6.4–8.4)
PSA SERPL-MCNC: 1.2 NG/ML (ref 0–4)
RBC # BLD AUTO: 5.2 MILLION/UL (ref 3.88–5.62)
SODIUM SERPL-SCNC: 136 MMOL/L (ref 135–147)
TRIGL SERPL-MCNC: 126 MG/DL
WBC # BLD AUTO: 7.73 THOUSAND/UL (ref 4.31–10.16)

## 2023-03-15 ENCOUNTER — OFFICE VISIT (OUTPATIENT)
Dept: FAMILY MEDICINE CLINIC | Facility: CLINIC | Age: 68
End: 2023-03-15

## 2023-03-15 VITALS
BODY MASS INDEX: 40.9 KG/M2 | HEART RATE: 72 BPM | HEIGHT: 72 IN | DIASTOLIC BLOOD PRESSURE: 80 MMHG | WEIGHT: 302 LBS | SYSTOLIC BLOOD PRESSURE: 110 MMHG

## 2023-03-15 DIAGNOSIS — I48.91 ATRIAL FIBRILLATION, UNSPECIFIED TYPE (HCC): ICD-10-CM

## 2023-03-15 DIAGNOSIS — G89.29 CHRONIC HEEL PAIN, RIGHT: ICD-10-CM

## 2023-03-15 DIAGNOSIS — E55.9 VITAMIN D DEFICIENCY: ICD-10-CM

## 2023-03-15 DIAGNOSIS — Z13.220 LIPID SCREENING: ICD-10-CM

## 2023-03-15 DIAGNOSIS — M79.671 CHRONIC HEEL PAIN, RIGHT: ICD-10-CM

## 2023-03-15 DIAGNOSIS — Z12.5 PROSTATE CANCER SCREENING: ICD-10-CM

## 2023-03-15 DIAGNOSIS — R73.9 HYPERGLYCEMIA: ICD-10-CM

## 2023-03-15 DIAGNOSIS — E66.01 MORBID OBESITY (HCC): ICD-10-CM

## 2023-03-15 DIAGNOSIS — N18.2 STAGE 2 CHRONIC KIDNEY DISEASE: ICD-10-CM

## 2023-03-15 DIAGNOSIS — G43.909 MIGRAINE SYNDROME: Primary | ICD-10-CM

## 2023-03-15 PROBLEM — E78.2 MIXED HYPERLIPIDEMIA: Status: RESOLVED | Noted: 2018-02-21 | Resolved: 2023-03-15

## 2023-03-15 PROBLEM — L03.116 CELLULITIS OF LEFT LOWER EXTREMITY: Status: RESOLVED | Noted: 2022-12-06 | Resolved: 2023-03-15

## 2023-03-15 NOTE — PROGRESS NOTES
Assessment and Plan:     Problem List Items Addressed This Visit        Cardiovascular and Mediastinum    A-fib ([facility] [address] )     Continue Xarelto  Relevant Orders    CBC and differential    Migraine syndrome - Primary     Stable on Inderal          Relevant Orders    CBC and differential       Genitourinary    Stage 2 chronic kidney disease     Lab Results   Component Value Date    EGFR 66 03/13/2023    EGFR 59 03/07/2022    EGFR 66 11/19/2021    CREATININE 1 14 03/13/2023    CREATININE 1 26 03/07/2022    CREATININE 1 15 11/19/2021   Patient only dipped below 60 once and therefore he only has stage II increase fluids avoid anti-inflammatories         Relevant Orders    CBC and differential       Other    Vitamin D deficiency     Vitamin D is great at 72  Relevant Orders    Vitamin D 25 hydroxy    Morbid obesity ([facility] [address] )     Patient highly encouraged to decrease caloric intake increase activity over time to lose weight BMI is 40  Other Visit Diagnoses     Hyperglycemia        Relevant Orders    Comprehensive metabolic panel    Prostate cancer screening        Relevant Orders    PSA, Total Screen    Lipid screening        Relevant Orders    Lipid panel        BMI Counseling: Body mass index is 40 96 kg/m²  The BMI is above normal  Nutrition recommendations include decreasing portion sizes, encouraging healthy choices of fruits and vegetables, decreasing fast food intake, consuming healthier snacks, limiting drinks that contain sugar, moderation in carbohydrate intake, increasing intake of lean protein, reducing intake of saturated and trans fat and reducing intake of cholesterol  Exercise recommendations include exercising 3-5 times per week  Rationale for BMI follow-up plan is due to patient being overweight or obese  Depression Screening and Follow-up Plan: Patient was screened for depression during today's encounter  They screened negative with a PHQ-2 score of 0        Preventive health issues were discussed with patient, and age appropriate screening tests were ordered as noted in patient's After Visit Summary  Personalized health advice and appropriate referrals for health education or preventive services given if needed, as noted in patient's After Visit Summary  History of Present Illness:     Patient presents for a Medicare Wellness Visit      Cara Khan is here for chronic conditions f/u   Pt  had labs done prior to today's visit which included Recent Results (from the past 672 hour(s))  -Lipid Panel with Direct LDL reflex:   Collection Time: 03/13/23  9:28 AM       Result                      Value             Ref Range           Cholesterol                 172               See Comment *       Triglycerides               126               See Comment *       HDL, Direct                 49                >=40 mg/dL          LDL Calculated              98                0 - 100 mg/dL  -Hemoglobin A1C:   Collection Time: 03/13/23  9:28 AM       Result                      Value             Ref Range           Hemoglobin A1C              5 6               Normal 3 8-5*       EAG                         114               mg/dl          -Comprehensive metabolic panel:   Collection Time: 03/13/23  9:28 AM       Result                      Value             Ref Range           Sodium                      136               135 - 147 mm*       Potassium                   4 3               3 5 - 5 3 mm*       Chloride                    104               96 - 108 mmo*       CO2                         30                21 - 32 mmol*       ANION GAP                   2 (L)             4 - 13 mmol/L       BUN                         23                5 - 25 mg/dL        Creatinine                  1 14              0 60 - 1 30 *       Glucose, Fasting            96                65 - 99 mg/dL       Calcium                     9 3               8 3 - 10 1 m*       AST                         34 5 - 45 U/L          ALT                         47                12 - 78 U/L         Alkaline Phosphatase        75                46 - 116 U/L        Total Protein               7 9               6 4 - 8 4 g/*       Albumin                     3 6               3 5 - 5 0 g/*       Total Bilirubin             0 67              0 20 - 1 00 *       eGFR                        66                ml/min/1 73s*  -CBC and differential:   Collection Time: 03/13/23  9:28 AM       Result                      Value             Ref Range           WBC                         7 73              4 31 - 10 16*       RBC                         5 20              3 88 - 5 62 *       Hemoglobin                  16 5              12 0 - 17 0 *       Hematocrit                  49 6 (H)          36 5 - 49 3 %       MCV                         95                82 - 98 fL          MCH                         31 7              26 8 - 34 3 *       MCHC                        33 3              31 4 - 37 4 *       RDW                         12 6              11 6 - 15 1 %       MPV                         10 4              8 9 - 12 7 fL       Platelets                   258               149 - 390 Th*       nRBC                        0                 /100 WBCs           Neutrophils Relative        72                43 - 75 %           Immat GRANS %               0                 0 - 2 %             Lymphocytes Relative        18                14 - 44 %           Monocytes Relative          8                 4 - 12 %            Eosinophils Relative        1                 0 - 6 %             Basophils Relative          1                 0 - 1 %             Neutrophils Absolute        5 54              1 85 - 7 62 *       Immature Grans Absolute     0 03              0 00 - 0 20 *       Lymphocytes Absolute        1 38              0 60 - 4 47 *       Monocytes Absolute          0 62              0 17 - 1 22 *       Eosinophils Absolute        0 11              0 00 - 0 61 *       Basophils Absolute          0 05              0 00 - 0 10 *  -Vitamin D 25 hydroxy:   Collection Time: 03/13/23  9:28 AM       Result                      Value             Ref Range           Vit D, 25-Hydroxy           65 1              30 0 - 100 0*  -PSA, Total Screen:   Collection Time: 03/13/23  9:28 AM       Result                      Value             Ref Range           PSA                         1 2               0 0 - 4 0 ng*         Patient Care Team:  Aimee Argueta PA-C as PCP - General (Family Medicine)  Aimee Argueta PA-C     Review of Systems:     Review of Systems   Constitutional: Negative  HENT: Negative  Eyes: Negative  Respiratory: Negative  Cardiovascular: Negative  Gastrointestinal: Negative  Endocrine: Negative  Genitourinary: Negative  Musculoskeletal: Negative  Skin: Negative  Allergic/Immunologic: Negative  Neurological: Negative  Hematological: Negative  Psychiatric/Behavioral: Negative  Problem List:     Patient Active Problem List   Diagnosis   • Diverticulosis   • A-fib ([facility] [address] )   • Allergic rhinitis   • Erectile dysfunction of non-organic origin   • Gastroesophageal reflux disease without esophagitis   • Migraine syndrome   • Vitamin D deficiency   • History of stroke without residual deficits   • Morbid obesity ([address] )   • Left knee injury   • Left abducens nerve palsy   • Stage 2 chronic kidney disease   • Lumbar radiculopathy   • Skin lesion of face      Past Medical and Surgical History:     Past Medical History:   Diagnosis Date   • Acute renal insufficiency     LAST ASSESSED: [id number]   • Chronic a-fib ([facility] [address] )    • Transient cerebral ischemia 05/16/2009   • Vitamin D deficiency     LAST ASSESSED: [id number]     History reviewed  No pertinent surgical history     Family History:     Family History   Problem Relation Age of Onset   • Arthritis Mother    • Arthritis Father    • Eczema Brother    • Skin cancer Neg Hx    • Diabetes Neg Hx       Social History:     Social History     Socioeconomic History   • Marital status: /Civil Union     Spouse name: None   • Number of children: None   • Years of education: None   • Highest education level: None   Occupational History   • Occupation: EMPLOYED   Tobacco Use   • Smoking status: Never   • Smokeless tobacco: Never   • Tobacco comments:     NO SECONDHAND SMOKE EXPOSURE   Vaping Use   • Vaping Use: Never used   Substance and Sexual Activity   • Alcohol use: Yes     Comment: SOCIAL   • Drug use: Never   • Sexual activity: None   Other Topics Concern   • None   Social History Narrative   • None     Social Determinants of Health     Financial Resource Strain: Low Risk    • Difficulty of Paying Living Expenses: Not hard at all   Food Insecurity: Not on file   Transportation Needs: No Transportation Needs   • Lack of Transportation (Medical): No   • Lack of Transportation (Non-Medical):  No   Physical Activity: Not on file   Stress: Not on file   Social Connections: Not on file   Intimate Partner Violence: Not on file   Housing Stability: Not on file      Medications and Allergies:     Current Outpatient Medications   Medication Sig Dispense Refill   • Ascorbic Acid (VITAMIN C PO) Take by mouth     • aspirin 81 MG tablet Take by mouth     • Coconut Oil 1000 MG CAPS Take 2 capsules by mouth daily       • diphenhydrAMINE (BENADRYL) 25 mg capsule Take 25 mg by mouth every 6 (six) hours as needed     • glucosamine-chondroitin 500-400 MG tablet Take 1 tablet by mouth 1500mg      • Menatetrenone (Vitamin K2) 100 MCG TABS Take by mouth     • Misc Natural Products (PUMPKIN SEED OIL) CAPS Take by mouth 1400      • Multiple Vitamin (MULTIVITAMIN) capsule Take by mouth     • propranolol (INDERAL LA) 60 mg 24 hr capsule Take 1 capsule (60 mg total) by mouth daily 90 capsule 1   • Red Yeast Rice 600 MG CAPS Take by mouth 2400mg      • Zinc 30 MG CAPS Take by mouth     • rivaroxaban (XARELTO) 20 mg tablet Take 20 mg by mouth daily with dinner       No current facility-administered medications for this visit  No Known Allergies   Immunizations:     Immunization History   Administered Date(s) Administered   • COVID-19 PFIZER VACCINE 0 3 ML IM 03/04/2021, 03/24/2021, 12/27/2021   • DTaP 5 11/18/2013   • Hep A, adult 01/15/2019   • INFLUENZA 12/24/2019, 11/06/2020, 11/16/2021   • Influenza Quadrivalent Preservative Free 3 years and older IM 10/27/2014, 01/04/2017, 02/21/2018   • Influenza Quadrivalent, 6-35 Months IM 11/02/2015   • Influenza, Seasonal Vaccine, Quadrivalent, Adjuvanted,  5e 11/16/2021   • Influenza, high dose seasonal 0 7 mL 12/29/2022   • Influenza, recombinant, quadrivalent,injectable, preservative free 02/27/2019   • Influenza, seasonal, injectable 12/14/2011, 11/14/2013   • Pneumococcal Conjugate Vaccine 20-valent (Pcv20), Polysace 12/29/2022   • Tdap 10/01/2013   • Typhoid, Unspecified 01/15/2019   • Typhoid, ViCPs 01/15/2019   • Zoster Vaccine Recombinant 07/22/2020, 12/10/2020      Health Maintenance:         Topic Date Due   • Colorectal Cancer Screening  02/09/2033   • Hepatitis C Screening  Completed         Topic Date Due   • COVID-19 Vaccine (4 - Booster for Pfizer series) 02/21/2022      Medicare Screening Tests and Risk Assessments:     Adelia Maddox is here for his Subsequent Wellness visit  Health Risk Assessment:   Patient rates overall health as very good  Patient feels that their physical health rating is same  Patient is very satisfied with their life  Eyesight was rated as same  Hearing was rated as same  Patient feels that their emotional and mental health rating is same  Patients states they are never, rarely angry  Patient states they are never, rarely unusually tired/fatigued  Pain experienced in the last 7 days has been none  Patient states that he has experienced no weight loss or gain in last 6 months       Depression Screening: PHQ-2 Score: 0      Fall Risk Screening: In the past year, patient has experienced: no history of falling in past year      Home Safety:  Patient does not have trouble with stairs inside or outside of their home  Patient has working smoke alarms and has working carbon monoxide detector  Home safety hazards include: none  Nutrition:   Current diet is Regular  Medications:   Patient is currently taking over-the-counter supplements  OTC medications include: Listed  Patient is able to manage medications  Activities of Daily Living (ADLs)/Instrumental Activities of Daily Living (IADLs):   Walk and transfer into and out of bed and chair?: Yes  Dress and groom yourself?: Yes    Bathe or shower yourself?: Yes    Feed yourself? Yes  Do your laundry/housekeeping?: Yes  Manage your money, pay your bills and track your expenses?: Yes  Make your own meals?: Yes    Do your own shopping?: Yes    Durable Medical Equipment Suppliers  None    Previous Hospitalizations:   Any hospitalizations or ED visits within the last 12 months?: Yes    How many hospitalizations have you had in the last year?: 1-2    Hospitalization Comments: Leg pain r/o clot and another r/o stroke  Advance Care Planning:   Living will: Yes    Durable POA for healthcare:  Yes    Advanced directive: Yes    Advanced directive counseling given: Yes    Five wishes given: Yes      Cognitive Screening:   Provider or family/friend/caregiver concerned regarding cognition?: No    PREVENTIVE SCREENINGS      Cardiovascular Screening:    General: Screening Not Indicated, History Lipid Disorder and Screening Current      Diabetes Screening:     General: Screening Current      Colorectal Cancer Screening:     General: Screening Current      Prostate Cancer Screening:    General: Screening Current      Osteoporosis Screening:    General: Screening Not Indicated      Abdominal Aortic Aneurysm (AAA) Screening:    Risk factors include: age between 73-67 yo General: Screening Not Indicated      Lung Cancer Screening:     General: Screening Not Indicated      Hepatitis C Screening:    General: Screening Current    Screening, Brief Intervention, and Referral to Treatment (SBIRT)    Screening  Typical number of drinks in a day: 0  Typical number of drinks in a week: 0  Interpretation: Low risk drinking behavior  AUDIT-C Screenin) How often did you have a drink containing alcohol in the past year? never  2) How many drinks did you have on a typical day when you were drinking in the past year? 0  3) How often did you have 6 or more drinks on one occasion in the past year? never    AUDIT-C Score: 0  Interpretation: Score 0-3 (male): Negative screen for alcohol misuse    Single Item Drug Screening:  How often have you used an illegal drug (including marijuana) or a prescription medication for non-medical reasons in the past year? never    Single Item Drug Screen Score: 0  Interpretation: Negative screen for possible drug use disorder    No results found  Physical Exam:     /80   Pulse 72   Ht 6' (1 829 m)   Wt (!) 137 kg (302 lb)   BMI 40 96 kg/m²     Physical Exam  Vitals and nursing note reviewed  Constitutional:       Appearance: Normal appearance  HENT:      Head: Normocephalic and atraumatic  Eyes:      General: Lids are normal       Conjunctiva/sclera: Conjunctivae normal       Pupils: Pupils are equal, round, and reactive to light  Cardiovascular:      Rate and Rhythm: Normal rate and regular rhythm  Heart sounds: Normal heart sounds  Pulmonary:      Effort: Pulmonary effort is normal       Breath sounds: Normal breath sounds  Skin:     General: Skin is warm and dry  Neurological:      General: No focal deficit present  Mental Status: He is alert  Mental status is at baseline  Psychiatric:         Mood and Affect: Mood normal          Behavior: Behavior normal          Thought Content:  Thought content normal  Judgment: Judgment normal           Dutch Mcbride PA-C

## 2023-03-15 NOTE — ASSESSMENT & PLAN NOTE
Lab Results   Component Value Date    EGFR 66 03/13/2023    EGFR 59 03/07/2022    EGFR 66 11/19/2021    CREATININE 1 14 03/13/2023    CREATININE 1 26 03/07/2022    CREATININE 1 15 11/19/2021   Patient only dipped below 60 once and therefore he only has stage II increase fluids avoid anti-inflammatories

## 2023-03-15 NOTE — PATIENT INSTRUCTIONS
1  Migraine syndrome  Assessment & Plan:  Stable on Inderal     Orders:  -     CBC and differential; Future; Expected date: 03/15/2024    2  Atrial fibrillation, unspecified type [facility])  Assessment & Plan:  Continue Xarelto  Orders:  -     CBC and differential; Future; Expected date: 03/15/2024    3  Stage 2 chronic kidney disease  Assessment & Plan:  Lab Results   Component Value Date    EGFR 66 03/13/2023    EGFR 59 03/07/2022    EGFR 66 11/19/2021    CREATININE 1 14 03/13/2023    CREATININE 1 26 03/07/2022    CREATININE 1 15 11/19/2021   Patient only dipped below 60 once and therefore he only has stage II increase fluids avoid anti-inflammatories    Orders:  -     CBC and differential; Future; Expected date: 03/15/2024    4  Morbid obesity ([address] )  Assessment & Plan:  Patient highly encouraged to decrease caloric intake increase activity over time to lose weight BMI is 40       5  Vitamin D deficiency  Assessment & Plan:  Vitamin D is great at 65  Orders:  -     Vitamin D 25 hydroxy; Future; Expected date: 03/15/2024    6  Hyperglycemia  -     Comprehensive metabolic panel; Future; Expected date: 03/15/2024    7  Prostate cancer screening  -     PSA, Total Screen; Future; Expected date: 03/15/2024    8  Lipid screening  -     Lipid panel; Future; Expected date: 03/15/2024    9  Chronic heel pain, right  Assessment & Plan:  Check heel xray to r/o spur  Continue ice massage, heel cups  Will consider podiatry       Orders:  -     XR heel / calcaneus 2+ vw right; Future; Expected date: 03/15/2023

## 2023-03-15 NOTE — ASSESSMENT & PLAN NOTE
Patient highly encouraged to decrease caloric intake increase activity over time to lose weight BMI is 40

## 2023-03-16 ENCOUNTER — APPOINTMENT (OUTPATIENT)
Dept: RADIOLOGY | Facility: MEDICAL CENTER | Age: 68
End: 2023-03-16

## 2023-03-16 DIAGNOSIS — G89.29 CHRONIC HEEL PAIN, RIGHT: ICD-10-CM

## 2023-03-16 DIAGNOSIS — M79.671 CHRONIC HEEL PAIN, RIGHT: ICD-10-CM

## 2023-03-28 DIAGNOSIS — G43.909 MIGRAINE SYNDROME: ICD-10-CM

## 2023-03-28 RX ORDER — PROPRANOLOL HCL 60 MG
CAPSULE, EXTENDED RELEASE 24HR ORAL
Qty: 90 CAPSULE | Refills: 1 | Status: SHIPPED | OUTPATIENT
Start: 2023-03-28

## 2023-10-23 DIAGNOSIS — G43.909 MIGRAINE SYNDROME: ICD-10-CM

## 2023-10-23 RX ORDER — PROPRANOLOL HCL 60 MG
CAPSULE, EXTENDED RELEASE 24HR ORAL
Qty: 90 CAPSULE | Refills: 3 | Status: SHIPPED | OUTPATIENT
Start: 2023-10-23

## 2024-03-12 ENCOUNTER — APPOINTMENT (OUTPATIENT)
Dept: LAB | Facility: CLINIC | Age: 69
End: 2024-03-12
Payer: MEDICARE

## 2024-03-12 DIAGNOSIS — E55.9 VITAMIN D DEFICIENCY: ICD-10-CM

## 2024-03-12 DIAGNOSIS — N18.2 STAGE 2 CHRONIC KIDNEY DISEASE: ICD-10-CM

## 2024-03-12 DIAGNOSIS — G43.909 MIGRAINE SYNDROME: ICD-10-CM

## 2024-03-12 DIAGNOSIS — I48.91 ATRIAL FIBRILLATION, UNSPECIFIED TYPE (HCC): ICD-10-CM

## 2024-03-12 DIAGNOSIS — Z12.5 PROSTATE CANCER SCREENING: ICD-10-CM

## 2024-03-12 DIAGNOSIS — R73.9 HYPERGLYCEMIA: ICD-10-CM

## 2024-03-12 DIAGNOSIS — Z13.220 LIPID SCREENING: ICD-10-CM

## 2024-03-12 LAB
25(OH)D3 SERPL-MCNC: 76.3 NG/ML (ref 30–100)
ALBUMIN SERPL BCP-MCNC: 4 G/DL (ref 3.5–5)
ALP SERPL-CCNC: 64 U/L (ref 34–104)
ALT SERPL W P-5'-P-CCNC: 17 U/L (ref 7–52)
ANION GAP SERPL CALCULATED.3IONS-SCNC: 4 MMOL/L (ref 4–13)
AST SERPL W P-5'-P-CCNC: 22 U/L (ref 13–39)
BASOPHILS # BLD AUTO: 0.05 THOUSANDS/ÂΜL (ref 0–0.1)
BASOPHILS NFR BLD AUTO: 1 % (ref 0–1)
BILIRUB SERPL-MCNC: 1.12 MG/DL (ref 0.2–1)
BUN SERPL-MCNC: 20 MG/DL (ref 5–25)
CALCIUM SERPL-MCNC: 9.4 MG/DL (ref 8.4–10.2)
CHLORIDE SERPL-SCNC: 99 MMOL/L (ref 96–108)
CHOLEST SERPL-MCNC: 164 MG/DL
CO2 SERPL-SCNC: 36 MMOL/L (ref 21–32)
CREAT SERPL-MCNC: 1.06 MG/DL (ref 0.6–1.3)
EOSINOPHIL # BLD AUTO: 0.22 THOUSAND/ÂΜL (ref 0–0.61)
EOSINOPHIL NFR BLD AUTO: 3 % (ref 0–6)
ERYTHROCYTE [DISTWIDTH] IN BLOOD BY AUTOMATED COUNT: 12.9 % (ref 11.6–15.1)
GFR SERPL CREATININE-BSD FRML MDRD: 72 ML/MIN/1.73SQ M
GLUCOSE P FAST SERPL-MCNC: 92 MG/DL (ref 65–99)
HCT VFR BLD AUTO: 50.4 % (ref 36.5–49.3)
HDLC SERPL-MCNC: 48 MG/DL
HGB BLD-MCNC: 16.1 G/DL (ref 12–17)
IMM GRANULOCYTES # BLD AUTO: 0.02 THOUSAND/UL (ref 0–0.2)
IMM GRANULOCYTES NFR BLD AUTO: 0 % (ref 0–2)
LDLC SERPL CALC-MCNC: 88 MG/DL (ref 0–100)
LYMPHOCYTES # BLD AUTO: 1.58 THOUSANDS/ÂΜL (ref 0.6–4.47)
LYMPHOCYTES NFR BLD AUTO: 23 % (ref 14–44)
MCH RBC QN AUTO: 31.8 PG (ref 26.8–34.3)
MCHC RBC AUTO-ENTMCNC: 31.9 G/DL (ref 31.4–37.4)
MCV RBC AUTO: 100 FL (ref 82–98)
MONOCYTES # BLD AUTO: 0.73 THOUSAND/ÂΜL (ref 0.17–1.22)
MONOCYTES NFR BLD AUTO: 10 % (ref 4–12)
NEUTROPHILS # BLD AUTO: 4.43 THOUSANDS/ÂΜL (ref 1.85–7.62)
NEUTS SEG NFR BLD AUTO: 63 % (ref 43–75)
NONHDLC SERPL-MCNC: 116 MG/DL
NRBC BLD AUTO-RTO: 0 /100 WBCS
PLATELET # BLD AUTO: 222 THOUSANDS/UL (ref 149–390)
PMV BLD AUTO: 10.5 FL (ref 8.9–12.7)
POTASSIUM SERPL-SCNC: 5 MMOL/L (ref 3.5–5.3)
PROT SERPL-MCNC: 7.3 G/DL (ref 6.4–8.4)
PSA SERPL-MCNC: 1.31 NG/ML (ref 0–4)
RBC # BLD AUTO: 5.06 MILLION/UL (ref 3.88–5.62)
SODIUM SERPL-SCNC: 139 MMOL/L (ref 135–147)
TRIGL SERPL-MCNC: 142 MG/DL
WBC # BLD AUTO: 7.03 THOUSAND/UL (ref 4.31–10.16)

## 2024-03-12 PROCEDURE — G0103 PSA SCREENING: HCPCS

## 2024-03-12 PROCEDURE — 80053 COMPREHEN METABOLIC PANEL: CPT

## 2024-03-12 PROCEDURE — 80061 LIPID PANEL: CPT

## 2024-03-12 PROCEDURE — 36415 COLL VENOUS BLD VENIPUNCTURE: CPT

## 2024-03-12 PROCEDURE — 85025 COMPLETE CBC W/AUTO DIFF WBC: CPT

## 2024-03-12 PROCEDURE — 82306 VITAMIN D 25 HYDROXY: CPT

## 2024-04-10 ENCOUNTER — RA CDI HCC (OUTPATIENT)
Dept: OTHER | Facility: HOSPITAL | Age: 69
End: 2024-04-10

## 2024-04-16 ENCOUNTER — OFFICE VISIT (OUTPATIENT)
Dept: FAMILY MEDICINE CLINIC | Facility: CLINIC | Age: 69
End: 2024-04-16
Payer: MEDICARE

## 2024-04-16 VITALS
HEIGHT: 72 IN | BODY MASS INDEX: 41.17 KG/M2 | WEIGHT: 304 LBS | HEART RATE: 62 BPM | OXYGEN SATURATION: 97 % | SYSTOLIC BLOOD PRESSURE: 124 MMHG | DIASTOLIC BLOOD PRESSURE: 78 MMHG

## 2024-04-16 DIAGNOSIS — E55.9 VITAMIN D DEFICIENCY: ICD-10-CM

## 2024-04-16 DIAGNOSIS — N18.2 STAGE 2 CHRONIC KIDNEY DISEASE: ICD-10-CM

## 2024-04-16 DIAGNOSIS — I48.91 ATRIAL FIBRILLATION, UNSPECIFIED TYPE (HCC): Primary | ICD-10-CM

## 2024-04-16 DIAGNOSIS — Z12.5 PROSTATE CANCER SCREENING: ICD-10-CM

## 2024-04-16 DIAGNOSIS — E66.01 MORBID OBESITY (HCC): ICD-10-CM

## 2024-04-16 DIAGNOSIS — Z13.220 LIPID SCREENING: ICD-10-CM

## 2024-04-16 DIAGNOSIS — Z00.00 ENCOUNTER FOR SUBSEQUENT ANNUAL WELLNESS VISIT IN MEDICARE PATIENT: ICD-10-CM

## 2024-04-16 DIAGNOSIS — G56.01 CARPAL TUNNEL SYNDROME ON RIGHT: ICD-10-CM

## 2024-04-16 PROCEDURE — G0439 PPPS, SUBSEQ VISIT: HCPCS | Performed by: PHYSICIAN ASSISTANT

## 2024-04-16 PROCEDURE — 99213 OFFICE O/P EST LOW 20 MIN: CPT | Performed by: PHYSICIAN ASSISTANT

## 2024-04-16 NOTE — ASSESSMENT & PLAN NOTE
Diagnosis explained decrease overuse wear wrist splint at night while sleeping to prevent nerve impingement.

## 2024-04-16 NOTE — PATIENT INSTRUCTIONS
1. Atrial fibrillation, unspecified type (HCC)  Assessment & Plan:  Continue Inderal and Xarelto.      2. Morbid obesity (HCC)  Assessment & Plan:  Patient's BMI is gone from 40 to 41 encouraged to decrease simple carbs increase activity.      3. Encounter for subsequent annual wellness visit in Medicare patient    4. Carpal tunnel syndrome on right  Assessment & Plan:  Diagnosis explained decrease overuse wear wrist splint at night while sleeping to prevent nerve impingement.

## 2024-04-16 NOTE — PROGRESS NOTES
Answers submitted by the patient for this visit:  Medicare Annual Wellness Visit (Submitted on 4/9/2024)  How would you rate your overall health?: very good  Compared to last year, how is your physical health?: slightly better  In general, how satisfied are you with your life?: very satisfied  Compared to last year, how is your eyesight?: same  Compared to last year, how is your hearing?: same  Compared to last year, how is your emotional/mental health?: slightly better  How often is anger a problem for you?: never, rarely  How often do you feel unusually tired/fatigued?: never, rarely  In the past 7 days, how much pain have you experienced?: none  In the past 6 months, have you lost or gained 10 pounds without trying?: No  One or more falls in the last year: No  Do you have trouble with the stairs inside or outside your home?: No  Does your home have working smoke alarms?: Yes  Does your home have a carbon monoxide monitor?: Yes  Which safety hazards (if any) have you experienced in your home? Please select all that apply.: none  How would you describe your current diet? Please select all that apply.: Regular  In addition to prescription medications, are you taking any over-the-counter supplements?: Yes  If yes, what supplements are you taking?: Multivitamin  Can you manage your medications?: Yes  Are you currently taking any opioid medications?: No  Can you walk and transfer into and out of your bed and chair?: Yes  Can you dress and groom yourself?: Yes  Can you bathe or shower yourself?: Yes  Can you feed yourself?: Yes  Can you do your laundry/ housekeeping?: Yes  Can you manage your money, pay your bills, and track your expenses?: Yes  Can you make your own meals?: Yes  Can you do your own shopping?: Yes  Please list your DME (Durable Medical Equipment) supplier, if you use one.: none  Within the last 12 months, have you had any hospitalizations or Emergency Department visits?: No  Do you have a living will?:  Yes  Do you have a Durable POA (Power of ) for healthcare decisions?: Yes  Do you have an Advanced Directive for end of life decisions?: Yes  How often have you used an illegal drug (including marijuana) or a prescription medication for non-medical reasons in the past year?: never  What is the typical number of drinks you consume in a day?: 0  What is the typical number of drinks you consume in a week?: 0  How often did you have a drink containing alcohol in the past year?: monthly or less  How many drinks did you have on a typical day  when you were drinking in the past year?: 1 to 2  How often did you have 6 or more drinks on one occasion in the past year?: never   Assessment and Plan:     Problem List Items Addressed This Visit        Cardiovascular and Mediastinum    A-fib (HCC) - Primary     Continue Inderal and Xarelto.            Nervous and Auditory    Carpal tunnel syndrome on right     Diagnosis explained decrease overuse wear wrist splint at night while sleeping to prevent nerve impingement.            Genitourinary    Stage 2 chronic kidney disease    Relevant Orders    CBC and differential    Comprehensive metabolic panel       Other    Vitamin D deficiency    Relevant Orders    Vitamin D 25 hydroxy    Morbid obesity (HCC)     Patient's BMI is gone from 40 to 41 encouraged to decrease simple carbs increase activity.        Other Visit Diagnoses     Encounter for subsequent annual wellness visit in Medicare patient        All labs, tests and screenings up to date at this time.    Lipid screening        Relevant Orders    Lipid panel    Prostate cancer screening        Relevant Orders    PSA, Total Screen          Depression Screening and Follow-up Plan: Patient was screened for depression during today's encounter. They screened negative with a PHQ-2 score of 0.      Preventive health issues were discussed with patient, and age appropriate screening tests were ordered as noted in patient's After  Visit Summary.  Personalized health advice and appropriate referrals for health education or preventive services given if needed, as noted in patient's After Visit Summary.     History of Present Illness:     Patient presents for a Medicare Wellness Visit    Patient presents with:  Medicare Wellness Visit: Pt due  Numbness: Pt complaints of possible carpal tunnel on right hand, pt states his fingers get numbed at night.           Patient Care Team:  Iva Vargas PA-C as PCP - General (Family Medicine)  Iva Vargsa PA-C     Review of Systems:     Review of Systems   Constitutional: Negative.    HENT: Negative.     Eyes: Negative.    Respiratory: Negative.     Cardiovascular: Negative.    Gastrointestinal: Negative.    Endocrine: Negative.    Genitourinary: Negative.    Musculoskeletal: Negative.    Skin: Negative.    Allergic/Immunologic: Negative.    Neurological: Negative.    Hematological: Negative.    Psychiatric/Behavioral: Negative.          Problem List:     Patient Active Problem List   Diagnosis   • Diverticulosis   • A-fib (HCC)   • Allergic rhinitis   • Erectile dysfunction of non-organic origin   • Gastroesophageal reflux disease without esophagitis   • Migraine syndrome   • Vitamin D deficiency   • History of stroke without residual deficits   • Morbid obesity (HCC)   • Left knee injury   • Left abducens nerve palsy   • Stage 2 chronic kidney disease   • Lumbar radiculopathy   • Skin lesion of face   • Chronic heel pain, right   • Carpal tunnel syndrome on right      Past Medical and Surgical History:     Past Medical History:   Diagnosis Date   • Acute renal insufficiency     LAST ASSESSED: [id number]   • Chronic a-fib (HCC)    • Transient cerebral ischemia 05/16/2009   • Vitamin D deficiency     LAST ASSESSED: [id number]     History reviewed. No pertinent surgical history.   Family History:     Family History   Problem Relation Age of Onset   • Arthritis Mother    • Arthritis Father    •  Eczema Brother    • Skin cancer Neg Hx    • Diabetes Neg Hx       Social History:     Social History     Socioeconomic History   • Marital status: /Civil Union     Spouse name: None   • Number of children: None   • Years of education: None   • Highest education level: None   Occupational History   • Occupation: EMPLOYED   Tobacco Use   • Smoking status: Never   • Smokeless tobacco: Never   • Tobacco comments:     NO SECONDHAND SMOKE EXPOSURE   Vaping Use   • Vaping status: Never Used   Substance and Sexual Activity   • Alcohol use: Yes     Comment: SOCIAL   • Drug use: Never   • Sexual activity: None   Other Topics Concern   • None   Social History Narrative   • None     Social Determinants of Health     Financial Resource Strain: Low Risk  (3/15/2023)    Overall Financial Resource Strain (CARDIA)    • Difficulty of Paying Living Expenses: Not hard at all   Food Insecurity: No Food Insecurity (4/9/2024)    Hunger Vital Sign    • Worried About Running Out of Food in the Last Year: Never true    • Ran Out of Food in the Last Year: Never true   Transportation Needs: No Transportation Needs (4/9/2024)    PRAPARE - Transportation    • Lack of Transportation (Medical): No    • Lack of Transportation (Non-Medical): No   Physical Activity: Not on file   Stress: Not on file   Social Connections: Not on file   Intimate Partner Violence: Not on file   Housing Stability: Low Risk  (4/9/2024)    Housing Stability Vital Sign    • Unable to Pay for Housing in the Last Year: No    • Number of Places Lived in the Last Year: 1    • Unstable Housing in the Last Year: No      Medications and Allergies:     Current Outpatient Medications   Medication Sig Dispense Refill   • Ascorbic Acid (VITAMIN C PO) Take by mouth     • aspirin 81 MG tablet Take by mouth     • Coconut Oil 1000 MG CAPS Take 2 capsules by mouth daily       • diphenhydrAMINE (BENADRYL) 25 mg capsule Take 25 mg by mouth every 6 (six) hours as needed     •  glucosamine-chondroitin 500-400 MG tablet Take 1 tablet by mouth 1500mg      • Menatetrenone (Vitamin K2) 100 MCG TABS Take by mouth     • Misc Natural Products (PUMPKIN SEED OIL) CAPS Take by mouth 1400      • Multiple Vitamin (MULTIVITAMIN) capsule Take by mouth     • propranolol (INDERAL LA) 60 mg 24 hr capsule TAKE 1 CAPSULE DAILY 90 capsule 3   • Red Yeast Rice 600 MG CAPS Take by mouth 2400mg      • Zinc 30 MG CAPS Take by mouth     • rivaroxaban (XARELTO) 20 mg tablet Take 20 mg by mouth daily with dinner       No current facility-administered medications for this visit.     No Known Allergies   Immunizations:     Immunization History   Administered Date(s) Administered   • COVID-19 PFIZER VACCINE 0.3 ML IM 03/04/2021, 03/24/2021, 12/27/2021   • DTaP 5 11/18/2013   • Hep A, adult 01/15/2019   • INFLUENZA 12/24/2019, 11/06/2020, 11/16/2021   • Influenza Quadrivalent Preservative Free 3 years and older IM 10/27/2014, 01/04/2017, 02/21/2018   • Influenza Quadrivalent, 6-35 Months IM 11/02/2015   • Influenza, Seasonal Vaccine, Quadrivalent, Adjuvanted, .5e 11/16/2021   • Influenza, high dose seasonal 0.7 mL 12/29/2022   • Influenza, recombinant, quadrivalent,injectable, preservative free 02/27/2019   • Influenza, seasonal, injectable 12/14/2011, 11/14/2013   • Pneumococcal Conjugate Vaccine 20-valent (Pcv20), Polysace 12/29/2022   • Tdap 10/01/2013   • Typhoid, Unspecified 01/15/2019   • Typhoid, ViCPs 01/15/2019   • Zoster Vaccine Recombinant 07/22/2020, 12/10/2020      Health Maintenance:         Topic Date Due   • Colorectal Cancer Screening  02/09/2033   • Hepatitis C Screening  Completed         Topic Date Due   • Influenza Vaccine (1) 09/01/2023   • COVID-19 Vaccine (4 - 2023-24 season) 09/01/2023      Medicare Screening Tests and Risk Assessments:     Isacc is here for his Subsequent Wellness visit.     Health Risk Assessment:   Patient rates overall health as very good. Patient feels that their physical  health rating is slightly better. Patient is very satisfied with their life. Eyesight was rated as same. Hearing was rated as same. Patient feels that their emotional and mental health rating is slightly better. Patients states they are never, rarely angry. Patient states they are never, rarely unusually tired/fatigued. Pain experienced in the last 7 days has been none. Patient states that he has experienced no weight loss or gain in last 6 months.     Depression Screening:   PHQ-2 Score: 0      Fall Risk Screening:   In the past year, patient has experienced: no history of falling in past year      Home Safety:  Patient does not have trouble with stairs inside or outside of their home. Patient has working smoke alarms and has working carbon monoxide detector. Home safety hazards include: none.     Nutrition:   Current diet is Regular.     Medications:   Patient is currently taking over-the-counter supplements. OTC medications include: see medication list. Patient is able to manage medications.     Activities of Daily Living (ADLs)/Instrumental Activities of Daily Living (IADLs):   Walk and transfer into and out of bed and chair?: Yes  Dress and groom yourself?: Yes    Bathe or shower yourself?: Yes    Feed yourself? Yes  Do your laundry/housekeeping?: Yes  Manage your money, pay your bills and track your expenses?: Yes  Make your own meals?: Yes    Do your own shopping?: Yes    Durable Medical Equipment Suppliers  none    Previous Hospitalizations:   Any hospitalizations or ED visits within the last 12 months?: No      Advance Care Planning:   Living will: Yes    Durable POA for healthcare: Yes    Advanced directive: Yes    Advanced directive counseling given: Yes      Cognitive Screening:   Provider or family/friend/caregiver concerned regarding cognition?: No    PREVENTIVE SCREENINGS      Cardiovascular Screening:    General: Screening Current      Diabetes Screening:     General: Screening Current       Colorectal Cancer Screening:     General: Screening Current      Prostate Cancer Screening:    General: Screening Current      Osteoporosis Screening:    General: Screening Not Indicated      Abdominal Aortic Aneurysm (AAA) Screening:    Risk factors include: age between 65-76 yo        General: Screening Not Indicated      Lung Cancer Screening:     General: Screening Not Indicated      Hepatitis C Screening:    General: Screening Current    Screening, Brief Intervention, and Referral to Treatment (SBIRT)    Screening  Typical number of drinks in a day: 0  Typical number of drinks in a week: 0  Interpretation: Low risk drinking behavior.    AUDIT-C Screenin) How often did you have a drink containing alcohol in the past year? monthly or less  2) How many drinks did you have on a typical day when you were drinking in the past year? 1 to 2  3) How often did you have 6 or more drinks on one occasion in the past year? never    AUDIT-C Score: 1  Interpretation: Score 0-3 (male): Negative screen for alcohol misuse    Single Item Drug Screening:  How often have you used an illegal drug (including marijuana) or a prescription medication for non-medical reasons in the past year? never    Single Item Drug Screen Score: 0  Interpretation: Negative screen for possible drug use disorder    No results found.     Physical Exam:     /78 (BP Location: Right arm, Patient Position: Sitting, Cuff Size: Large)   Pulse 62   Ht 6' (1.829 m)   Wt (!) 138 kg (304 lb)   SpO2 97%   BMI 41.23 kg/m²     Physical Exam  Vitals and nursing note reviewed.   Constitutional:       Appearance: Normal appearance.   HENT:      Head: Normocephalic and atraumatic.   Eyes:      General: Lids are normal.      Conjunctiva/sclera: Conjunctivae normal.      Pupils: Pupils are equal, round, and reactive to light.   Cardiovascular:      Rate and Rhythm: Normal rate and regular rhythm.      Heart sounds: Normal heart sounds.   Pulmonary:       Effort: Pulmonary effort is normal.      Breath sounds: Normal breath sounds.   Skin:     General: Skin is warm and dry.   Neurological:      General: No focal deficit present.      Mental Status: He is alert. Mental status is at baseline.   Psychiatric:         Mood and Affect: Mood normal.         Behavior: Behavior normal.         Thought Content: Thought content normal.         Judgment: Judgment normal.          Iva Vargas PA-C

## 2024-10-17 DIAGNOSIS — G43.909 MIGRAINE SYNDROME: ICD-10-CM

## 2024-10-17 RX ORDER — PROPRANOLOL HYDROCHLORIDE 60 MG/1
CAPSULE, EXTENDED RELEASE ORAL
Qty: 90 CAPSULE | Refills: 1 | Status: SHIPPED | OUTPATIENT
Start: 2024-10-17

## 2025-04-15 DIAGNOSIS — G43.909 MIGRAINE SYNDROME: ICD-10-CM

## 2025-04-16 ENCOUNTER — APPOINTMENT (OUTPATIENT)
Dept: LAB | Facility: CLINIC | Age: 70
End: 2025-04-16
Payer: MEDICARE

## 2025-04-16 DIAGNOSIS — N18.2 STAGE 2 CHRONIC KIDNEY DISEASE: ICD-10-CM

## 2025-04-16 DIAGNOSIS — Z12.5 PROSTATE CANCER SCREENING: ICD-10-CM

## 2025-04-16 DIAGNOSIS — E55.9 VITAMIN D DEFICIENCY: ICD-10-CM

## 2025-04-16 DIAGNOSIS — Z13.220 LIPID SCREENING: ICD-10-CM

## 2025-04-16 LAB
25(OH)D3 SERPL-MCNC: 71 NG/ML (ref 30–100)
ALBUMIN SERPL BCG-MCNC: 3.9 G/DL (ref 3.5–5)
ALP SERPL-CCNC: 80 U/L (ref 34–104)
ALT SERPL W P-5'-P-CCNC: 17 U/L (ref 7–52)
ANION GAP SERPL CALCULATED.3IONS-SCNC: 6 MMOL/L (ref 4–13)
AST SERPL W P-5'-P-CCNC: 19 U/L (ref 13–39)
BASOPHILS # BLD AUTO: 0.04 THOUSANDS/ÂΜL (ref 0–0.1)
BASOPHILS NFR BLD AUTO: 1 % (ref 0–1)
BILIRUB SERPL-MCNC: 1 MG/DL (ref 0.2–1)
BUN SERPL-MCNC: 21 MG/DL (ref 5–25)
CALCIUM SERPL-MCNC: 9.2 MG/DL (ref 8.4–10.2)
CHLORIDE SERPL-SCNC: 99 MMOL/L (ref 96–108)
CHOLEST SERPL-MCNC: 169 MG/DL (ref ?–200)
CO2 SERPL-SCNC: 34 MMOL/L (ref 21–32)
CREAT SERPL-MCNC: 1 MG/DL (ref 0.6–1.3)
EOSINOPHIL # BLD AUTO: 0.18 THOUSAND/ÂΜL (ref 0–0.61)
EOSINOPHIL NFR BLD AUTO: 2 % (ref 0–6)
ERYTHROCYTE [DISTWIDTH] IN BLOOD BY AUTOMATED COUNT: 12.8 % (ref 11.6–15.1)
GFR SERPL CREATININE-BSD FRML MDRD: 76 ML/MIN/1.73SQ M
GLUCOSE P FAST SERPL-MCNC: 86 MG/DL (ref 65–99)
HCT VFR BLD AUTO: 48.4 % (ref 36.5–49.3)
HDLC SERPL-MCNC: 49 MG/DL
HGB BLD-MCNC: 16 G/DL (ref 12–17)
IMM GRANULOCYTES # BLD AUTO: 0.03 THOUSAND/UL (ref 0–0.2)
IMM GRANULOCYTES NFR BLD AUTO: 0 % (ref 0–2)
LDLC SERPL CALC-MCNC: 99 MG/DL (ref 0–100)
LYMPHOCYTES # BLD AUTO: 1.58 THOUSANDS/ÂΜL (ref 0.6–4.47)
LYMPHOCYTES NFR BLD AUTO: 21 % (ref 14–44)
MCH RBC QN AUTO: 31.7 PG (ref 26.8–34.3)
MCHC RBC AUTO-ENTMCNC: 33.1 G/DL (ref 31.4–37.4)
MCV RBC AUTO: 96 FL (ref 82–98)
MONOCYTES # BLD AUTO: 0.74 THOUSAND/ÂΜL (ref 0.17–1.22)
MONOCYTES NFR BLD AUTO: 10 % (ref 4–12)
NEUTROPHILS # BLD AUTO: 4.81 THOUSANDS/ÂΜL (ref 1.85–7.62)
NEUTS SEG NFR BLD AUTO: 66 % (ref 43–75)
NONHDLC SERPL-MCNC: 120 MG/DL
NRBC BLD AUTO-RTO: 0 /100 WBCS
PLATELET # BLD AUTO: 198 THOUSANDS/UL (ref 149–390)
PMV BLD AUTO: 10.4 FL (ref 8.9–12.7)
POTASSIUM SERPL-SCNC: 4.4 MMOL/L (ref 3.5–5.3)
PROT SERPL-MCNC: 7.3 G/DL (ref 6.4–8.4)
PSA SERPL-MCNC: 0.86 NG/ML (ref 0–4)
RBC # BLD AUTO: 5.05 MILLION/UL (ref 3.88–5.62)
SODIUM SERPL-SCNC: 139 MMOL/L (ref 135–147)
TRIGL SERPL-MCNC: 105 MG/DL (ref ?–150)
WBC # BLD AUTO: 7.38 THOUSAND/UL (ref 4.31–10.16)

## 2025-04-16 PROCEDURE — G0103 PSA SCREENING: HCPCS

## 2025-04-16 PROCEDURE — 82306 VITAMIN D 25 HYDROXY: CPT

## 2025-04-16 PROCEDURE — 80053 COMPREHEN METABOLIC PANEL: CPT

## 2025-04-16 PROCEDURE — 36415 COLL VENOUS BLD VENIPUNCTURE: CPT

## 2025-04-16 PROCEDURE — 80061 LIPID PANEL: CPT

## 2025-04-16 PROCEDURE — 85025 COMPLETE CBC W/AUTO DIFF WBC: CPT

## 2025-04-16 RX ORDER — PROPRANOLOL HYDROCHLORIDE 60 MG/1
60 CAPSULE, EXTENDED RELEASE ORAL DAILY
Qty: 90 CAPSULE | Refills: 3 | Status: SHIPPED | OUTPATIENT
Start: 2025-04-16

## 2025-04-22 ENCOUNTER — OFFICE VISIT (OUTPATIENT)
Dept: FAMILY MEDICINE CLINIC | Facility: CLINIC | Age: 70
End: 2025-04-22
Payer: MEDICARE

## 2025-04-22 VITALS
DIASTOLIC BLOOD PRESSURE: 60 MMHG | RESPIRATION RATE: 16 BRPM | HEART RATE: 67 BPM | TEMPERATURE: 99.2 F | WEIGHT: 315 LBS | BODY MASS INDEX: 42.66 KG/M2 | SYSTOLIC BLOOD PRESSURE: 118 MMHG | HEIGHT: 72 IN | OXYGEN SATURATION: 96 %

## 2025-04-22 DIAGNOSIS — M25.511 CHRONIC RIGHT SHOULDER PAIN: ICD-10-CM

## 2025-04-22 DIAGNOSIS — G56.01 CARPAL TUNNEL SYNDROME ON RIGHT: ICD-10-CM

## 2025-04-22 DIAGNOSIS — N18.2 STAGE 2 CHRONIC KIDNEY DISEASE: ICD-10-CM

## 2025-04-22 DIAGNOSIS — Z86.73 HISTORY OF STROKE WITHOUT RESIDUAL DEFICITS: ICD-10-CM

## 2025-04-22 DIAGNOSIS — E66.01 MORBID OBESITY (HCC): ICD-10-CM

## 2025-04-22 DIAGNOSIS — I48.91 ATRIAL FIBRILLATION, UNSPECIFIED TYPE (HCC): ICD-10-CM

## 2025-04-22 DIAGNOSIS — G43.909 MIGRAINE SYNDROME: ICD-10-CM

## 2025-04-22 DIAGNOSIS — E55.9 VITAMIN D DEFICIENCY: Primary | ICD-10-CM

## 2025-04-22 DIAGNOSIS — Z00.00 MEDICARE ANNUAL WELLNESS VISIT, SUBSEQUENT: ICD-10-CM

## 2025-04-22 DIAGNOSIS — Z12.5 PROSTATE CANCER SCREENING: ICD-10-CM

## 2025-04-22 DIAGNOSIS — G89.29 CHRONIC RIGHT SHOULDER PAIN: ICD-10-CM

## 2025-04-22 PROCEDURE — G0439 PPPS, SUBSEQ VISIT: HCPCS | Performed by: PHYSICIAN ASSISTANT

## 2025-04-22 PROCEDURE — 99214 OFFICE O/P EST MOD 30 MIN: CPT | Performed by: PHYSICIAN ASSISTANT

## 2025-04-22 PROCEDURE — G2211 COMPLEX E/M VISIT ADD ON: HCPCS | Performed by: PHYSICIAN ASSISTANT

## 2025-04-22 NOTE — PROGRESS NOTES
Name: Isacc Teresa      : 1955      MRN: [id number]  Encounter Provider: Iva Vargas PA-C  Encounter Date: 2025   Encounter department: [facility]  :  Assessment & Plan  Atrial fibrillation, unspecified type (HCC)  Continue Xarelto lifelong.  Patient sees cardiology annually.    Orders:  •  rivaroxaban (Xarelto) 20 mg tablet; Take 1 tablet (20 mg total) by mouth daily with dinner  •  Lipid panel; Future    Morbid obesity (HCC)      Orders:  •  Lipid panel; Future    Vitamin D deficiency  Vitamin D very stable at 71.  Patient does take supplementation daily.  Orders:  •  Vitamin D 25 hydroxy; Future    History of stroke without residual deficits  Patient continues daily aspirin.       Stage 2 chronic kidney disease  Lab Results   Component Value Date    EGFR 76 2025    EGFR 72 2024    EGFR 66 2023    CREATININE 1.00 2025    CREATININE 1.06 2024    CREATININE 1.14 2023   GFR has consistently been excellent in the 70s.  Make sure to increase water and avoid regular anti-inflammatories.  Orders:  •  CBC and differential; Future  •  Comprehensive metabolic panel; Future    Migraine syndrome  Patient continues daily Inderal for prevention.       Medicare annual wellness visit, subsequent  Patient is up-to-date with all screenings and immunizations and blood work for his age.  He does state he has a living will so encouraged him to bring this into scanned into his chart.       Carpal tunnel syndrome on right  Wrist splint at night makes it worse so it is not a properly fitting splint. Recommend trial of a different one. PT not ready to see hand specialist.        Chronic right shoulder pain  Past three months with anterior R shoulder pain. Works out a the gym several days a week. Does want to see ortho for this. Order placed.   Orders:  •  Ambulatory Referral to Orthopedic Surgery; Future    Prostate cancer screening    Orders:  •  PSA, Total  Screen; Future      Depression Screening and Follow-up Plan: Patient was screened for depression during today's encounter. They screened negative with a PHQ-2 score of 0.        Preventive health issues were discussed with patient, and age appropriate screening tests were ordered as noted in patient's After Visit Summary. Personalized health advice and appropriate referrals for health education or preventive services given if needed, as noted in patient's After Visit Summary.    History of Present Illness     Patient presents with:  Medicare Wellness Visit      Isacc Teresa is here for chronic conditions f/u. Pt. had labs done prior to today's visit which included Recent Results (from the past 4 weeks)  -CBC and differential:   Collection Time: 04/16/25  9:31 AM       Result                      Value             Ref Range           WBC                         7.38              4.31 - 10.16*       RBC                         5.05              3.88 - 5.62 *       Hemoglobin                  16.0              12.0 - 17.0 *       Hematocrit                  48.4              36.5 - 49.3 %       MCV                         96                82 - 98 fL          MCH                         31.7              26.8 - 34.3 *       MCHC                        33.1              31.4 - 37.4 *       RDW                         12.8              11.6 - 15.1 %       MPV                         10.4              8.9 - 12.7 fL       Platelets                   198               149 - 390 Th*       nRBC                        0                 /100 WBCs           Segmented %                 66                43 - 75 %           Immature Grans %            0                 0 - 2 %             Lymphocytes %               21                14 - 44 %           Monocytes %                 10                4 - 12 %            Eosinophils Relative        2                 0 - 6 %             Basophils Relative          1                  0 - 1 %             Absolute Neutrophils        4.81              1.85 - 7.62 *       Absolute Immature Grans     0.03              0.00 - 0.20 *       Absolute Lymphocytes        1.58              0.60 - 4.47 *       Absolute Monocytes          0.74              0.17 - 1.22 *       Eosinophils Absolute        0.18              0.00 - 0.61 *       Basophils Absolute          0.04              0.00 - 0.10 *  -Comprehensive metabolic panel:   Collection Time: 04/16/25  9:31 AM       Result                      Value             Ref Range           Sodium                      139               135 - 147 mm*       Potassium                   4.4               3.5 - 5.3 mm*       Chloride                    99                96 - 108 mmo*       CO2                         34 (H)            21 - 32 mmol*       ANION GAP                   6                 4 - 13 mmol/L       BUN                         21                5 - 25 mg/dL        Creatinine                  1.00              0.60 - 1.30 *       Glucose, Fasting            86                65 - 99 mg/dL       Calcium                     9.2               8.4 - 10.2 m*       AST                         19                13 - 39 U/L         ALT                         17                7 - 52 U/L          Alkaline Phosphatase        80                34 - 104 U/L        Total Protein               7.3               6.4 - 8.4 g/*       Albumin                     3.9               3.5 - 5.0 g/*       Total Bilirubin             1.00              0.20 - 1.00 *       eGFR                        76                ml/min/1.73s*  -Lipid panel:   Collection Time: 04/16/25  9:31 AM       Result                      Value             Ref Range           Cholesterol                 169               See Comment *       Triglycerides               105               See Comment *       HDL, Direct                 49                >=40 mg/dL          LDL Calculated               99                0 - 100 mg/dL       Non-HDL-Chol (CHOL-HDL)     120               mg/dl          -PSA, Total Screen:   Collection Time: 04/16/25  9:31 AM       Result                      Value             Ref Range           PSA                         0.861             0.000 - 4.00*  -Vitamin D 25 hydroxy:   Collection Time: 04/16/25  9:31 AM       Result                      Value             Ref Range           Vit D, 25-Hydroxy           71.0              30.0 - 100.0*         Patient Care Team:  Iva Vargas PA-C as PCP - General (Family Medicine)  Iva Vargas PA-C    Review of Systems   Constitutional: Negative.    HENT: Negative.     Eyes: Negative.    Respiratory: Negative.     Cardiovascular: Negative.    Gastrointestinal: Negative.    Endocrine: Negative.    Genitourinary: Negative.    Musculoskeletal: Negative.    Skin: Negative.    Allergic/Immunologic: Negative.    Neurological: Negative.    Hematological: Negative.    Psychiatric/Behavioral: Negative.       Medical History Reviewed by provider this encounter:       Annual Wellness Visit Questionnaire   Isacc is here for his Subsequent Wellness visit.     Health Risk Assessment:   Patient rates overall health as very good. Patient feels that their physical health rating is same. Patient is very satisfied with their life. Eyesight was rated as same. Hearing was rated as same. Patient feels that their emotional and mental health rating is same. Patients states they are never, rarely angry. Patient states they are never, rarely unusually tired/fatigued. Pain experienced in the last 7 days has been some. Patient's pain rating has been 1/10. Patient states that he has experienced no weight loss or gain in last 6 months.     Depression Screening:   PHQ-2 Score: 0      Fall Risk Screening:   In the past year, patient has experienced: no history of falling in past year      Home Safety:  Patient does not have trouble with stairs inside or  outside of their home. Patient has working smoke alarms and has working carbon monoxide detector. Home safety hazards include: none.     Nutrition:   Current diet is Regular.     Medications:   Patient is currently taking over-the-counter supplements. OTC medications include: see medication list. Patient is able to manage medications.     Activities of Daily Living (ADLs)/Instrumental Activities of Daily Living (IADLs):   Walk and transfer into and out of bed and chair?: Yes  Dress and groom yourself?: Yes    Bathe or shower yourself?: Yes    Feed yourself? Yes  Do your laundry/housekeeping?: Yes  Manage your money, pay your bills and track your expenses?: Yes  Make your own meals?: Yes    Do your own shopping?: Yes    Durable Medical Equipment Suppliers  None    Previous Hospitalizations:   Any hospitalizations or ED visits within the last 12 months?: No      Advance Care Planning:   Living will: Yes    Durable POA for healthcare: Yes    Advanced directive: Yes    Advanced directive counseling given: Yes      Cognitive Screening:   Provider or family/friend/caregiver concerned regarding cognition?: No    Preventive Screenings      Cardiovascular Screening:    General: Screening Current      Diabetes Screening:     General: Screening Current      Colorectal Cancer Screening:     General: Screening Current      Prostate Cancer Screening:    General: Screening Current      Osteoporosis Screening:    General: Screening Not Indicated      Abdominal Aortic Aneurysm (AAA) Screening:    Risk factors include: age between 65-74 yo        Lung Cancer Screening:     General: Screening Not Indicated      Hepatitis C Screening:    General: Screening Current    Screening, Brief Intervention, and Referral to Treatment (SBIRT)     Screening  Typical number of drinks in a day: 0  Typical number of drinks in a week: 0  Interpretation: Low risk drinking behavior.    AUDIT-C Screenin) How often did you have a drink containing  alcohol in the past year? monthly or less  2) How many drinks did you have on a typical day when you were drinking in the past year? 1 to 2  3) How often did you have 6 or more drinks on one occasion in the past year? never    AUDIT-C Score: 1  Interpretation: Score 0-3 (male): Negative screen for alcohol misuse    Single Item Drug Screening:  How often have you used an illegal drug (including marijuana) or a prescription medication for non-medical reasons in the past year? never    Single Item Drug Screen Score: 0  Interpretation: Negative screen for possible drug use disorder    Social Drivers of Health     Financial Resource Strain: Low Risk  (3/15/2023)    Overall Financial Resource Strain (CARDIA)    • Difficulty of Paying Living Expenses: Not hard at all   Food Insecurity: No Food Insecurity (4/17/2025)    Hunger Vital Sign    • Worried About Running Out of Food in the Last Year: Never true    • Ran Out of Food in the Last Year: Never true   Transportation Needs: No Transportation Needs (4/17/2025)    PRAPARE - Transportation    • Lack of Transportation (Medical): No    • Lack of Transportation (Non-Medical): No   Housing Stability: Low Risk  (4/17/2025)    Housing Stability Vital Sign    • Unable to Pay for Housing in the Last Year: No    • Number of Times Moved in the Last Year: 0    • Homeless in the Last Year: No   Utilities: Not At Risk (4/17/2025)    [facility] Utilities    • Threatened with loss of utilities: No     No results found.    Objective   /60 (BP Location: Left arm, Patient Position: Sitting, Cuff Size: Adult)   Pulse 67   Temp 99.2 °F (37.3 °C) (Tympanic)   Resp 16   Ht 6' (1.829 m)   Wt (!) 181 kg (398 lb)   SpO2 96%   BMI 53.98 kg/m²     Physical Exam  Vitals and nursing note reviewed.   Constitutional:       Appearance: Normal appearance.   HENT:      Head: Normocephalic and atraumatic.   Eyes:      General: Lids are normal.      Conjunctiva/sclera: Conjunctivae normal.      Pupils:  Pupils are equal, round, and reactive to light.   Cardiovascular:      Rate and Rhythm: Normal rate and regular rhythm.      Heart sounds: Normal heart sounds.   Pulmonary:      Effort: Pulmonary effort is normal.      Breath sounds: Normal breath sounds.   Musculoskeletal:      Comments: FROM to R shoulder.    Skin:     General: Skin is warm and dry.   Neurological:      General: No focal deficit present.      Mental Status: He is alert. Mental status is at baseline.   Psychiatric:         Mood and Affect: Mood normal.         Behavior: Behavior normal.         Thought Content: Thought content normal.         Judgment: Judgment normal.

## 2025-04-22 NOTE — ASSESSMENT & PLAN NOTE
Continue Xarelto lifelong.  Patient sees cardiology annually.    Orders:  •  rivaroxaban (Xarelto) 20 mg tablet; Take 1 tablet (20 mg total) by mouth daily with dinner  •  Lipid panel; Future

## 2025-04-22 NOTE — PATIENT INSTRUCTIONS
1. Vitamin D deficiency  Assessment & Plan:  Vitamin D very stable at 71.  Patient does take supplementation daily.  Orders:    Vitamin D 25 hydroxy; Future    Orders:  -     Vitamin D 25 hydroxy; Future; Expected date: 04/22/2026  2. Atrial fibrillation, unspecified type (HCC)  Assessment & Plan:  Continue Xarelto lifelong.  Patient sees cardiology annually.    Orders:    rivaroxaban (Xarelto) 20 mg tablet; Take 1 tablet (20 mg total) by mouth daily with dinner    Lipid panel; Future    Orders:  -     rivaroxaban (Xarelto) 20 mg tablet; Take 1 tablet (20 mg total) by mouth daily with dinner  -     Lipid panel; Future; Expected date: 04/22/2026  3. Morbid obesity (HCC)  Assessment & Plan:      Orders:    Lipid panel; Future    Orders:  -     Lipid panel; Future; Expected date: 04/22/2026  4. History of stroke without residual deficits  Assessment & Plan:  Patient continues daily aspirin.       5. Stage 2 chronic kidney disease  Assessment & Plan:  Lab Results   Component Value Date    EGFR 76 04/16/2025    EGFR 72 03/12/2024    EGFR 66 03/13/2023    CREATININE 1.00 04/16/2025    CREATININE 1.06 03/12/2024    CREATININE 1.14 03/13/2023   GFR has consistently been excellent in the 70s.  Make sure to increase water and avoid regular anti-inflammatories.  Orders:    CBC and differential; Future    Comprehensive metabolic panel; Future    Orders:  -     CBC and differential; Future; Expected date: 04/22/2026  -     Comprehensive metabolic panel; Future; Expected date: 04/22/2026  6. Migraine syndrome  Assessment & Plan:  Patient continues daily Inderal for prevention.       7. Medicare annual wellness visit, subsequent  8. Carpal tunnel syndrome on right  Assessment & Plan:         9. Chronic right shoulder pain  -     Ambulatory Referral to Orthopedic Surgery; Future  10. Prostate cancer screening  -     PSA, Total Screen; Future; Expected date: 04/22/2026      Medicare Preventive Visit Patient Instructions  Thank you  for completing your Welcome to Medicare Visit or Medicare Annual Wellness Visit today. Your next wellness visit will be due in one year (4/23/2026).  The screening/preventive services that you may require over the next 5-10 years are detailed below. Some tests may not apply to you based off risk factors and/or age. Screening tests ordered at today's visit but not completed yet may show as past due. Also, please note that scanned in results may not display below.  Preventive Screenings:  Service Recommendations Previous Testing/Comments   Colorectal Cancer Screening  Colonoscopy    Fecal Occult Blood Test (FOBT)/Fecal Immunochemical Test (FIT)  Fecal DNA/Cologuard Test  Flexible Sigmoidoscopy Age: 45-75 years old   Colonoscopy: every 10 years (May be performed more frequently if at higher risk)  OR  FOBT/FIT: every 1 year  OR  Cologuard: every 3 years  OR  Sigmoidoscopy: every 5 years  Screening may be recommended earlier than age 45 if at higher risk for colorectal cancer. Also, an individualized decision between you and your healthcare provider will decide whether screening between the ages of 76-85 would be appropriate. Colonoscopy: 02/09/2023  FOBT/FIT: Not on file  Cologuard: Not on file  Sigmoidoscopy: Not on file    Screening Current     Prostate Cancer Screening Individualized decision between patient and health care provider in men between ages of 55-69   Medicare will cover every 12 months beginning on the day after your 50th birthday PSA: 0.861 ng/mL     Screening Current     Hepatitis C Screening Once for adults born between 1945 and 1965  More frequently in patients at high risk for Hepatitis C Hep C Antibody: 03/04/2021    Screening Current   Diabetes Screening 1-2 times per year if you're at risk for diabetes or have pre-diabetes Fasting glucose: 86 mg/dL (4/16/2025)  A1C: 5.6 % (3/13/2023)  Screening Current   Cholesterol Screening Once every 5 years if you don't have a lipid disorder. May order more  often based on risk factors. Lipid panel: 04/16/2025  Screening Current      Other Preventive Screenings Covered by Medicare:  Abdominal Aortic Aneurysm (AAA) Screening: covered once if your at risk. You're considered to be at risk if you have a family history of AAA or a male between the age of 65-75 who smoking at least 100 cigarettes in your lifetime.  Lung Cancer Screening: covers low dose CT scan once per year if you meet all of the following conditions: (1) Age 55-77; (2) No signs or symptoms of lung cancer; (3) Current smoker or have quit smoking within the last 15 years; (4) You have a tobacco smoking history of at least 20 pack years (packs per day x number of years you smoked); (5) You get a written order from a healthcare provider.  Glaucoma Screening: covered annually if you're considered high risk: (1) You have diabetes OR (2) Family history of glaucoma OR (3)  aged 50 and older OR (4)  American aged 65 and older  Osteoporosis Screening: covered every 2 years if you meet one of the following conditions: (1) Have a vertebral abnormality; (2) On glucocorticoid therapy for more than 3 months; (3) Have primary hyperparathyroidism; (4) On osteoporosis medications and need to assess response to drug therapy.  HIV Screening: covered annually if you're between the age of 15-65. Also covered annually if you are younger than 15 and older than 65 with risk factors for HIV infection. For pregnant patients, it is covered up to 3 times per pregnancy.    Immunizations:  Immunization Recommendations   Influenza Vaccine Annual influenza vaccination during flu season is recommended for all persons aged >= 6 months who do not have contraindications   Pneumococcal Vaccine   * Pneumococcal conjugate vaccine = PCV13 (Prevnar 13), PCV15 (Vaxneuvance), PCV20 (Prevnar 20)  * Pneumococcal polysaccharide vaccine = PPSV23 (Pneumovax) Adults 19-63 yo with certain risk factors or if 65+ yo  If never received  any pneumonia vaccine: recommend Prevnar 20 (PCV20)  Give PCV20 if previously received 1 dose of PCV13 or PPSV23   Hepatitis B Vaccine 3 dose series if at intermediate or high risk (ex: diabetes, end stage renal disease, liver disease)   Respiratory syncytial virus (RSV) Vaccine - COVERED BY MEDICARE PART D  * RSVPreF3 (Arexvy) CDC recommends that adults 60 years of age and older may receive a single dose of RSV vaccine using shared clinical decision-making (SCDM)   Tetanus (Td) Vaccine - COST NOT COVERED BY MEDICARE PART B Following completion of primary series, a booster dose should be given every 10 years to maintain immunity against tetanus. Td may also be given as tetanus wound prophylaxis.   Tdap Vaccine - COST NOT COVERED BY MEDICARE PART B Recommended at least once for all adults. For pregnant patients, recommended with each pregnancy.   Shingles Vaccine (Shingrix) - COST NOT COVERED BY MEDICARE PART B  2 shot series recommended in those 19 years and older who have or will have weakened immune systems or those 50 years and older     Health Maintenance Due:      Topic Date Due    Colorectal Cancer Screening  02/09/2033    Hepatitis C Screening  Completed     Immunizations Due:      Topic Date Due    COVID-19 Vaccine (4 - 2024-25 season) 09/01/2024     Advance Directives   What are advance directives?  Advance directives are legal documents that state your wishes and plans for medical care. These plans are made ahead of time in case you lose your ability to make decisions for yourself. Advance directives can apply to any medical decision, such as the treatments you want, and if you want to donate organs.   What are the types of advance directives?  There are many types of advance directives, and each state has rules about how to use them. You may choose a combination of any of the following:  Living will:  This is a written record of the treatment you want. You can also choose which treatments you do not want,  which to limit, and which to stop at a certain time. This includes surgery, medicine, IV fluid, and tube feedings.   Durable power of  for healthcare (DPAHC):  This is a written record that states who you want to make healthcare choices for you when you are unable to make them for yourself. This person, called a proxy, is usually a family member or a friend. You may choose more than 1 proxy.  Do not resuscitate (DNR) order:  A DNR order is used in case your heart stops beating or you stop breathing. It is a request not to have certain forms of treatment, such as CPR. A DNR order may be included in other types of advance directives.  Medical directive:  This covers the care that you want if you are in a coma, near death, or unable to make decisions for yourself. You can list the treatments you want for each condition. Treatment may include pain medicine, surgery, blood transfusions, dialysis, IV or tube feedings, and a ventilator (breathing machine).  Values history:  This document has questions about your views, beliefs, and how you feel and think about life. This information can help others choose the care that you would choose.  Why are advance directives important?  An advance directive helps you control your care. Although spoken wishes may be used, it is better to have your wishes written down. Spoken wishes can be misunderstood, or not followed. Treatments may be given even if you do not want them. An advance directive may make it easier for your family to make difficult choices about your care.   Weight Management   Why it is important to manage your weight:  Being overweight increases your risk of health conditions such as heart disease, high blood pressure, type 2 diabetes, and certain types of cancer. It can also increase your risk for osteoarthritis, sleep apnea, and other respiratory problems. Aim for a slow, steady weight loss. Even a small amount of weight loss can lower your risk of health  problems.  How to lose weight safely:  A safe and healthy way to lose weight is to eat fewer calories and get regular exercise. You can lose up about 1 pound a week by decreasing the number of calories you eat by 500 calories each day.   Healthy meal plan for weight management:  A healthy meal plan includes a variety of foods, contains fewer calories, and helps you stay healthy. A healthy meal plan includes the following:  Eat whole-grain foods more often.  A healthy meal plan should contain fiber. Fiber is the part of grains, fruits, and vegetables that is not broken down by your body. Whole-grain foods are healthy and provide extra fiber in your diet. Some examples of whole-grain foods are whole-wheat breads and pastas, oatmeal, brown rice, and bulgur.  Eat a variety of vegetables every day.  Include dark, leafy greens such as spinach, kale, porfirio greens, and mustard greens. Eat yellow and orange vegetables such as carrots, sweet potatoes, and winter squash.   Eat a variety of fruits every day.  Choose fresh or canned fruit (canned in its own juice or light syrup) instead of juice. Fruit juice has very little or no fiber.  Eat low-fat dairy foods.  Drink fat-free (skim) milk or 1% milk. Eat fat-free yogurt and low-fat cottage cheese. Try low-fat cheeses such as mozzarella and other reduced-fat cheeses.  Choose meat and other protein foods that are low in fat.  Choose beans or other legumes such as split peas or lentils. Choose fish, skinless poultry (chicken or turkey), or lean cuts of red meat (beef or pork). Before you cook meat or poultry, cut off any visible fat.   Use less fat and oil.  Try baking foods instead of frying them. Add less fat, such as margarine, sour cream, regular salad dressing and mayonnaise to foods. Eat fewer high-fat foods. Some examples of high-fat foods include french fries, doughnuts, ice cream, and cakes.  Eat fewer sweets.  Limit foods and drinks that are high in sugar. This  includes candy, cookies, regular soda, and sweetened drinks.  Exercise:  Exercise at least 30 minutes per day on most days of the week. Some examples of exercise include walking, biking, dancing, and swimming. You can also fit in more physical activity by taking the stairs instead of the elevator or parking farther away from stores. Ask your healthcare provider about the best exercise plan for you.      © Copyright [employer] 2018 Information is for End User's use only and may not be sold, redistributed or otherwise used for commercial purposes. All illustrations and images included in CareNotes® are the copyrighted property of [employer] or [employer]

## 2025-04-22 NOTE — ASSESSMENT & PLAN NOTE
Vitamin D very stable at 71.  Patient does take supplementation daily.  Orders:  •  Vitamin D 25 hydroxy; Future

## 2025-04-22 NOTE — ASSESSMENT & PLAN NOTE
Lab Results   Component Value Date    EGFR 76 04/16/2025    EGFR 72 03/12/2024    EGFR 66 03/13/2023    CREATININE 1.00 04/16/2025    CREATININE 1.06 03/12/2024    CREATININE 1.14 03/13/2023   GFR has consistently been excellent in the 70s.  Make sure to increase water and avoid regular anti-inflammatories.  Orders:  •  CBC and differential; Future  •  Comprehensive metabolic panel; Future

## 2025-04-22 NOTE — ASSESSMENT & PLAN NOTE
Wrist splint at night makes it worse so it is not a properly fitting splint. Recommend trial of a different one. PT not ready to see hand specialist.

## 2025-05-09 ENCOUNTER — APPOINTMENT (OUTPATIENT)
Dept: RADIOLOGY | Facility: MEDICAL CENTER | Age: 70
End: 2025-05-09
Attending: ORTHOPAEDIC SURGERY
Payer: MEDICARE

## 2025-05-09 VITALS — BODY MASS INDEX: 41.78 KG/M2 | HEIGHT: 72 IN | WEIGHT: 308.5 LBS

## 2025-05-09 DIAGNOSIS — G89.29 CHRONIC RIGHT SHOULDER PAIN: ICD-10-CM

## 2025-05-09 DIAGNOSIS — M25.511 RIGHT SHOULDER PAIN, UNSPECIFIED CHRONICITY: ICD-10-CM

## 2025-05-09 DIAGNOSIS — M75.81 ROTATOR CUFF TENDONITIS, RIGHT: Primary | ICD-10-CM

## 2025-05-09 DIAGNOSIS — M25.511 CHRONIC RIGHT SHOULDER PAIN: ICD-10-CM

## 2025-05-09 PROCEDURE — 99203 OFFICE O/P NEW LOW 30 MIN: CPT | Performed by: ORTHOPAEDIC SURGERY

## 2025-05-09 PROCEDURE — 73030 X-RAY EXAM OF SHOULDER: CPT

## 2025-05-09 NOTE — PROGRESS NOTES
Orthopaedic Surgery - Office Note  Isacc Teresa (69 y.o. male)   : 1955   MRN: [id number]  Encounter Date: 2025    Assessment & Plan  Rotator cuff tendonitis, right    The patient has an examination consistent with rotator cuff tendonitis.    I have discussed with the patient the pathophysiology of this diagnosis and reviewed how the examination correlates with this diagnosis.    Treatment options were discussed at length and after discussing these treatment options, the patient was provided with a referral to PT.  If symptoms persist consider MRI   Follow-up:  as needed       Chronic right shoulder pain    Orders:    Ambulatory Referral to Orthopedic Surgery               Chief Complaint / Date of Onset  Right shoulder  Injury Mechanism / Date  1 yr dragging a heavy garden hose  Surgery / Date  None    History of Present Illness   Isacc Teresa is a 69 y.o. male who presents with a chief complaint of right shoulder pain.   The pain began 1 year(s) ago after dragging a heavy garden hose around his yard.  Pain improved on it's own and just recently worsened. The patient describes the pain as aching and dull in intensity,  intermittent in timing, and localizes the pain to the  right lateral shoulder.  The pain is worse with overuse and relieved by rest.  The pain is not associated with numbness and tingling.  The pain is not associated with constitutional symptoms. The patient is not awoken at night by the pain.      Treatment Summary  Medications / Modalities  None  Bracing / Immobilization  None  Physical Therapy  None  Injections  None  Prior Surgeries  None  Other Treatments  Gym    Employment / Current Status  retired    Sport / Organization / Current Status  N/A      Review of Systems  Pertinent items are noted in HPI.  All other systems were reviewed and are negative.      Physical Exam  Ht 6' (1.829 m)   Wt (!) 140 kg (308 lb 8 oz)   BMI 41.84 kg/m²   Cons: Appears well.  No apparent  distress.  Psych: Alert. Oriented x3.  Mood and affect normal.  Eyes: PERRLA, EOMI  Resp: Normal effort.  No audible wheezing or stridor.  CV: Palpable pulse.  No discernable arrhythmia.  No LE edema.  Lymph:  No palpable cervical, axillary, or inguinal lymphadenopathy.  Skin: Warm.  No palpable masses.  No visible lesions.  Neuro: Normal muscle tone.  Normal and symmetric DTR's.     Right Shoulder Exam  Alignment / Posture:  Normal shoulder posture.  Inspection:  No swelling. No edema. No erythema. No ecchymosis. No muscle atrophy.  Palpation:  No tenderness.  ROM:  Shoulder . Shoulder ER 60. Shoulder IR T8.  Strength:  Supraspinatus 5/5. Infraspinatus 5/5. Subscapularis 5/5.  Stability:  No objective shoulder instability.  Tests: (+) Field. (+) Neer. (-) Speed.  Neurovascular:  Sensation intact in Ax/R/M/U nerve distributions. 2+ radial pulse.       Studies Reviewed  XR of right shoulder - mild degenerative changes no fracture or dislocation      Procedures  No procedures today.    Medical, Surgical, Family, and Social History  The patient's medical history, family history, and social history, were reviewed and updated as appropriate.    Past Medical History:   Diagnosis Date    Acute renal insufficiency     LAST ASSESSED: [id number]    Chronic a-fib (HCC)     Transient cerebral ischemia 05/16/2009    Vitamin D deficiency     LAST ASSESSED: [id number]       History reviewed. No pertinent surgical history.    Family History   Problem Relation Age of Onset    Arthritis Mother     Arthritis Father     Eczema Brother     Skin cancer Neg Hx     Diabetes Neg Hx        Social History     Occupational History    Occupation: EMPLOYED   Tobacco Use    Smoking status: Never    Smokeless tobacco: Never    Tobacco comments:     NO SECONDHAND SMOKE EXPOSURE   Vaping Use    Vaping status: Never Used   Substance and Sexual Activity    Alcohol use: Yes     Comment: SOCIAL    Drug use: Never    Sexual activity: Not on file        No Known Allergies      Current Outpatient Medications:     Ascorbic Acid (VITAMIN C PO), Take by mouth, Disp: , Rfl:     aspirin 81 MG tablet, Take by mouth, Disp: , Rfl:     Coconut Oil 1000 MG CAPS, Take 2 capsules by mouth daily  , Disp: , Rfl:     diphenhydrAMINE (BENADRYL) 25 mg capsule, Take 25 mg by mouth every 6 (six) hours as needed, Disp: , Rfl:     glucosamine-chondroitin 500-400 MG tablet, Take 1 tablet by mouth 1500mg , Disp: , Rfl:     Menatetrenone (Vitamin K2) 100 MCG TABS, Take by mouth, Disp: , Rfl:     Misc Natural Products (PUMPKIN SEED OIL) CAPS, Take by mouth 1400 , Disp: , Rfl:     Multiple Vitamin (MULTIVITAMIN) capsule, Take by mouth, Disp: , Rfl:     propranolol (INDERAL LA) 60 mg 24 hr capsule, TAKE 1 CAPSULE DAILY, Disp: 90 capsule, Rfl: 3    Red Yeast Rice 600 MG CAPS, Take by mouth 2400mg , Disp: , Rfl:     rivaroxaban (Xarelto) 20 mg tablet, Take 1 tablet (20 mg total) by mouth daily with dinner, Disp: , Rfl:     Zinc 30 MG CAPS, Take by mouth, Disp: , Rfl:       Monie Solis MA    Scribe Attestation      I,:  Monie Solis MA am acting as a scribe while in the presence of the attending physician.:       I,:  Rolf Rodríguez MD personally performed the services described in this documentation    as scribed in my presence.: